# Patient Record
Sex: FEMALE | Race: WHITE | Employment: OTHER | URBAN - METROPOLITAN AREA
[De-identification: names, ages, dates, MRNs, and addresses within clinical notes are randomized per-mention and may not be internally consistent; named-entity substitution may affect disease eponyms.]

---

## 2018-05-10 ENCOUNTER — HOSPITAL ENCOUNTER (OUTPATIENT)
Dept: ULTRASOUND IMAGING | Age: 73
End: 2018-05-10
Payer: MEDICARE

## 2018-05-10 ENCOUNTER — HOSPITAL ENCOUNTER (OUTPATIENT)
Dept: MAMMOGRAPHY | Age: 73
Discharge: HOME | End: 2018-05-12
Payer: MEDICARE

## 2018-05-10 DIAGNOSIS — Z09 FOLLOW-UP EXAM, 3-6 MONTHS SINCE PREVIOUS EXAM: ICD-10-CM

## 2018-05-10 PROCEDURE — G0279 TOMOSYNTHESIS, MAMMO: HCPCS

## 2019-10-07 ENCOUNTER — HOSPITAL ENCOUNTER (OUTPATIENT)
Dept: MAMMOGRAPHY | Age: 74
Discharge: HOME | End: 2019-10-09
Payer: MEDICARE

## 2019-10-07 DIAGNOSIS — Z12.39 SCREENING FOR BREAST CANCER: ICD-10-CM

## 2019-10-07 PROCEDURE — 77067 SCR MAMMO BI INCL CAD: CPT

## 2019-10-14 ENCOUNTER — HOSPITAL ENCOUNTER (OUTPATIENT)
Dept: MAMMOGRAPHY | Age: 74
Discharge: HOME | End: 2019-10-16
Payer: MEDICARE

## 2019-10-14 ENCOUNTER — HOSPITAL ENCOUNTER (OUTPATIENT)
Dept: ULTRASOUND IMAGING | Age: 74
End: 2019-10-14
Payer: MEDICARE

## 2019-10-14 DIAGNOSIS — N64.89 BREAST ASYMMETRY: ICD-10-CM

## 2019-10-14 PROCEDURE — G0279 TOMOSYNTHESIS, MAMMO: HCPCS

## 2020-01-16 LAB
AVERAGE GLUCOSE: 117
CHOLESTEROL, TOTAL: 203 MG/DL
CHOLESTEROL/HDL RATIO: 2.4
HBA1C MFR BLD: 5.7 %
HDLC SERPL-MCNC: 84 MG/DL (ref 35–70)
LDL CHOLESTEROL CALCULATED: 92 MG/DL (ref 0–160)
NONHDLC SERPL-MCNC: ABNORMAL MG/DL
TRIGL SERPL-MCNC: 137 MG/DL
VITAMIN B-12: 190
VITAMIN D 25-HYDROXY: 43
VITAMIN D2, 25 HYDROXY: NORMAL
VITAMIN D3,25 HYDROXY: NORMAL
VLDLC SERPL CALC-MCNC: 27 MG/DL

## 2020-09-17 VITALS
RESPIRATION RATE: 14 BRPM | WEIGHT: 203 LBS | DIASTOLIC BLOOD PRESSURE: 70 MMHG | HEART RATE: 76 BPM | SYSTOLIC BLOOD PRESSURE: 120 MMHG | TEMPERATURE: 95.5 F

## 2020-09-17 RX ORDER — PROPRANOLOL HCL 60 MG
60 CAPSULE, EXTENDED RELEASE 24HR ORAL DAILY
COMMUNITY
End: 2021-02-08 | Stop reason: SDUPTHER

## 2020-09-17 RX ORDER — PRAVASTATIN SODIUM 10 MG
10 TABLET ORAL DAILY
COMMUNITY
End: 2021-02-08 | Stop reason: SDUPTHER

## 2020-09-17 RX ORDER — HYDROCODONE BITARTRATE AND ACETAMINOPHEN 5; 325 MG/1; MG/1
1 TABLET ORAL EVERY 6 HOURS PRN
COMMUNITY
End: 2021-02-08 | Stop reason: SDUPTHER

## 2020-09-17 RX ORDER — OMEPRAZOLE 40 MG/1
40 CAPSULE, DELAYED RELEASE ORAL DAILY
COMMUNITY
End: 2021-02-08 | Stop reason: SDUPTHER

## 2020-09-17 RX ORDER — CHOLECALCIFEROL (VITAMIN D3) 1250 MCG
1 CAPSULE ORAL WEEKLY
COMMUNITY
End: 2022-04-12 | Stop reason: SDUPTHER

## 2020-09-17 RX ORDER — TIZANIDINE 4 MG/1
4 TABLET ORAL 2 TIMES DAILY PRN
COMMUNITY
End: 2020-11-17 | Stop reason: SDUPTHER

## 2020-09-17 RX ORDER — AMLODIPINE BESYLATE 5 MG/1
5 TABLET ORAL DAILY
COMMUNITY
End: 2021-02-08 | Stop reason: SDUPTHER

## 2020-09-17 RX ORDER — BUPROPION HYDROCHLORIDE 150 MG/1
300 TABLET ORAL
COMMUNITY
Start: 2004-09-27 | End: 2021-02-08 | Stop reason: SDUPTHER

## 2020-09-17 SDOH — HEALTH STABILITY: MENTAL HEALTH: HOW OFTEN DO YOU HAVE A DRINK CONTAINING ALCOHOL?: NEVER

## 2020-11-17 RX ORDER — TIZANIDINE 4 MG/1
4 TABLET ORAL 2 TIMES DAILY PRN
Qty: 60 TABLET | Refills: 0 | Status: SHIPPED
Start: 2020-11-17 | End: 2020-12-18

## 2020-12-18 RX ORDER — TIZANIDINE 4 MG/1
TABLET ORAL
Qty: 180 TABLET | Refills: 0 | Status: SHIPPED
Start: 2020-12-18 | End: 2021-01-05 | Stop reason: SDUPTHER

## 2021-01-05 RX ORDER — TIZANIDINE 4 MG/1
4 TABLET ORAL 2 TIMES DAILY
Qty: 60 TABLET | Refills: 1 | Status: SHIPPED
Start: 2021-01-05 | End: 2021-02-08 | Stop reason: SDUPTHER

## 2021-01-05 NOTE — TELEPHONE ENCOUNTER
Patient is requesting a refill of:  Medication: tiZANidine (ZANAFLEX) 4 MG tablet  Dose: 4 mg  Frequency:  daily  Pharmacy: [id number] seen Visit date not found  Next appt Visit date not found

## 2021-01-29 LAB
ALBUMIN SERPL-MCNC: NORMAL G/DL
ALP BLD-CCNC: NORMAL U/L
ALT SERPL-CCNC: NORMAL U/L
ANION GAP SERPL CALCULATED.3IONS-SCNC: NORMAL MMOL/L
AST SERPL-CCNC: NORMAL U/L
AVERAGE GLUCOSE: NORMAL
BASOPHILS ABSOLUTE: NORMAL
BASOPHILS RELATIVE PERCENT: NORMAL
BILIRUB SERPL-MCNC: NORMAL MG/DL
BUN BLDV-MCNC: NORMAL MG/DL
CALCIUM SERPL-MCNC: NORMAL MG/DL
CHLORIDE BLD-SCNC: NORMAL MMOL/L
CHOLESTEROL, TOTAL: NORMAL
CHOLESTEROL/HDL RATIO: NORMAL
CO2: NORMAL
CREAT SERPL-MCNC: NORMAL MG/DL
EOSINOPHILS ABSOLUTE: NORMAL
EOSINOPHILS RELATIVE PERCENT: NORMAL
GFR CALCULATED: NORMAL
GLUCOSE BLD-MCNC: NORMAL MG/DL
HBA1C MFR BLD: 5.7 %
HCT VFR BLD CALC: NORMAL %
HDLC SERPL-MCNC: NORMAL MG/DL
HEMOGLOBIN: NORMAL
LDL CHOLESTEROL CALCULATED: NORMAL
LYMPHOCYTES ABSOLUTE: NORMAL
LYMPHOCYTES RELATIVE PERCENT: NORMAL
MCH RBC QN AUTO: NORMAL PG
MCHC RBC AUTO-ENTMCNC: NORMAL G/DL
MCV RBC AUTO: NORMAL FL
MONOCYTES ABSOLUTE: NORMAL
MONOCYTES RELATIVE PERCENT: NORMAL
NEUTROPHILS ABSOLUTE: NORMAL
NEUTROPHILS RELATIVE PERCENT: NORMAL
NONHDLC SERPL-MCNC: NORMAL MG/DL
PLATELET # BLD: NORMAL 10*3/UL
PMV BLD AUTO: NORMAL FL
POTASSIUM SERPL-SCNC: NORMAL MMOL/L
RBC # BLD: NORMAL 10*6/UL
SODIUM BLD-SCNC: NORMAL MMOL/L
TOTAL PROTEIN: NORMAL
TRIGL SERPL-MCNC: NORMAL MG/DL
URIC ACID: NORMAL
VLDLC SERPL CALC-MCNC: NORMAL MG/DL
WBC # BLD: NORMAL 10*3/UL

## 2021-02-08 ENCOUNTER — OFFICE VISIT (OUTPATIENT)
Dept: FAMILY MEDICINE CLINIC | Age: 76
End: 2021-02-08
Payer: MEDICARE

## 2021-02-08 VITALS
OXYGEN SATURATION: 97 % | BODY MASS INDEX: 31.86 KG/M2 | TEMPERATURE: 98 F | WEIGHT: 203 LBS | HEIGHT: 67 IN | HEART RATE: 92 BPM | DIASTOLIC BLOOD PRESSURE: 80 MMHG | SYSTOLIC BLOOD PRESSURE: 128 MMHG

## 2021-02-08 DIAGNOSIS — R92.8 ABNORMAL MAMMOGRAM: ICD-10-CM

## 2021-02-08 DIAGNOSIS — Z00.00 ANNUAL PHYSICAL EXAM: Primary | ICD-10-CM

## 2021-02-08 DIAGNOSIS — I10 ESSENTIAL HYPERTENSION: ICD-10-CM

## 2021-02-08 DIAGNOSIS — E78.5 HYPERLIPIDEMIA, UNSPECIFIED HYPERLIPIDEMIA TYPE: ICD-10-CM

## 2021-02-08 DIAGNOSIS — G89.29 CHRONIC BILATERAL LOW BACK PAIN, UNSPECIFIED WHETHER SCIATICA PRESENT: ICD-10-CM

## 2021-02-08 DIAGNOSIS — M76.60 ACHILLES TENDINITIS, UNSPECIFIED LATERALITY: ICD-10-CM

## 2021-02-08 DIAGNOSIS — M54.50 CHRONIC BILATERAL LOW BACK PAIN, UNSPECIFIED WHETHER SCIATICA PRESENT: ICD-10-CM

## 2021-02-08 DIAGNOSIS — D18.00 CAVERNOUS ANGIOMA: ICD-10-CM

## 2021-02-08 LAB
BILIRUBIN, POC: NORMAL
BLOOD URINE, POC: NORMAL
CLARITY, POC: NORMAL
COLOR, POC: NORMAL
GLUCOSE URINE, POC: NORMAL
KETONES, POC: NORMAL
LEUKOCYTE EST, POC: NORMAL
NITRITE, POC: NORMAL
PH, POC: 5.5
PROTEIN, POC: NORMAL
SPECIFIC GRAVITY, POC: 1.03
UROBILINOGEN, POC: 0.2

## 2021-02-08 PROCEDURE — 81002 URINALYSIS NONAUTO W/O SCOPE: CPT | Performed by: INTERNAL MEDICINE

## 2021-02-08 PROCEDURE — 93000 ELECTROCARDIOGRAM COMPLETE: CPT | Performed by: INTERNAL MEDICINE

## 2021-02-08 PROCEDURE — 99397 PER PM REEVAL EST PAT 65+ YR: CPT | Performed by: INTERNAL MEDICINE

## 2021-02-08 PROCEDURE — G8482 FLU IMMUNIZE ORDER/ADMIN: HCPCS | Performed by: INTERNAL MEDICINE

## 2021-02-08 RX ORDER — OMEPRAZOLE 40 MG/1
40 CAPSULE, DELAYED RELEASE ORAL DAILY
Qty: 90 CAPSULE | Refills: 1 | Status: SHIPPED
Start: 2021-02-08 | End: 2021-06-14 | Stop reason: SDUPTHER

## 2021-02-08 RX ORDER — AMLODIPINE BESYLATE 5 MG/1
5 TABLET ORAL DAILY
Qty: 90 TABLET | Refills: 1 | Status: SHIPPED
Start: 2021-02-08 | End: 2021-06-14 | Stop reason: SDUPTHER

## 2021-02-08 RX ORDER — TIZANIDINE 4 MG/1
4 TABLET ORAL 2 TIMES DAILY
Qty: 60 TABLET | Refills: 1 | Status: SHIPPED
Start: 2021-02-08 | End: 2021-03-10

## 2021-02-08 RX ORDER — PRAVASTATIN SODIUM 10 MG
10 TABLET ORAL DAILY
Qty: 90 TABLET | Refills: 1 | Status: SHIPPED
Start: 2021-02-08 | End: 2021-06-14 | Stop reason: SDUPTHER

## 2021-02-08 RX ORDER — METHYLPREDNISOLONE 4 MG/1
TABLET ORAL
Qty: 1 KIT | Refills: 0 | Status: SHIPPED | OUTPATIENT
Start: 2021-02-08 | End: 2021-02-14

## 2021-02-08 RX ORDER — PROPRANOLOL HCL 60 MG
60 CAPSULE, EXTENDED RELEASE 24HR ORAL DAILY
Qty: 90 CAPSULE | Refills: 1 | Status: SHIPPED
Start: 2021-02-08 | End: 2021-06-14 | Stop reason: SDUPTHER

## 2021-02-08 RX ORDER — BUPROPION HYDROCHLORIDE 300 MG/1
300 TABLET ORAL EVERY MORNING
Qty: 90 TABLET | Refills: 1 | Status: SHIPPED
Start: 2021-02-08 | End: 2021-06-14 | Stop reason: SDUPTHER

## 2021-02-08 RX ORDER — HYDROCODONE BITARTRATE AND ACETAMINOPHEN 5; 325 MG/1; MG/1
1 TABLET ORAL 2 TIMES DAILY PRN
Qty: 60 TABLET | Refills: 0 | Status: SHIPPED | OUTPATIENT
Start: 2021-02-08 | End: 2021-03-10

## 2021-02-08 SDOH — ECONOMIC STABILITY: FOOD INSECURITY: WITHIN THE PAST 12 MONTHS, YOU WORRIED THAT YOUR FOOD WOULD RUN OUT BEFORE YOU GOT MONEY TO BUY MORE.: NEVER TRUE

## 2021-02-08 SDOH — ECONOMIC STABILITY: INCOME INSECURITY: HOW HARD IS IT FOR YOU TO PAY FOR THE VERY BASICS LIKE FOOD, HOUSING, MEDICAL CARE, AND HEATING?: NOT ASKED

## 2021-02-08 SDOH — ECONOMIC STABILITY: TRANSPORTATION INSECURITY
IN THE PAST 12 MONTHS, HAS LACK OF TRANSPORTATION KEPT YOU FROM MEETINGS, WORK, OR FROM GETTING THINGS NEEDED FOR DAILY LIVING?: NO

## 2021-02-08 SDOH — ECONOMIC STABILITY: FOOD INSECURITY: WITHIN THE PAST 12 MONTHS, THE FOOD YOU BOUGHT JUST DIDN'T LAST AND YOU DIDN'T HAVE MONEY TO GET MORE.: NEVER TRUE

## 2021-02-08 ASSESSMENT — PATIENT HEALTH QUESTIONNAIRE - PHQ9
SUM OF ALL RESPONSES TO PHQ9 QUESTIONS 1 & 2: 0
SUM OF ALL RESPONSES TO PHQ QUESTIONS 1-9: 0
2. FEELING DOWN, DEPRESSED OR HOPELESS: 0

## 2021-02-08 NOTE — PROGRESS NOTES
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     Per patient at age 6-9   [place] Cerebrovascular disease     Chronic back pain     GERD (gastroesophageal reflux disease)     Headache     Hyperlipidemia         Social History     Socioeconomic History    Marital status: Single     Spouse name: Not on file    Number of children: Not on file    Years of education: Not on file    Highest education level: Not on file   Occupational History    Not on file   Social Needs    Financial resource strain: Not on file    Food insecurity     Worry: Never true     Inability: Never true    Transportation needs     Medical: No     Non-medical: No   Tobacco Use    Smoking status: Never Smoker    Smokeless tobacco: Never Used   Substance and Sexual Activity    Alcohol use: Never     Frequency: Never    Drug use: Never    Sexual activity: Not on file   Lifestyle    Physical activity     Days per week: Not on file     Minutes per session: Not on file    Stress: Not on file   Relationships    Social connections     Talks on phone: Not on file     Gets together: Not on file Attends [employer] service: Not on file     Active member of club or organization: Not on file     Attends meetings of clubs or organizations: Not on file     Relationship status: Not on file    Intimate partner violence     Fear of current or ex partner: Not on file     Emotionally abused: Not on file     Physically abused: Not on file     Forced sexual activity: Not on file   Other Topics Concern    Not on file   Social History Narrative    Not on file        Past Surgical History:   Procedure Laterality Date    BRAIN SURGERY  07/2010    Cerebellar Cavernous Angioma resection     BREAST CYST ASPIRATION      BREAST REDUCTION SURGERY Bilateral 1993    HAND SURGERY Left     trapezius removal     HYSTERECTOMY, TOTAL ABDOMINAL  1979    SALIVARY GLAND SURGERY Left 1992    TONSILLECTOMY          Family History   Problem Relation Age of Onset    Heart Attack Father 61        Allergies   Allergen Reactions    Aspirin     Codeine Hives    Linzess [Linaclotide]      crasmps        ROS  No acute distress  Cardiac: Denies any chest pain or palpitation  No exertional angina no MI or CHF  Respiratory: Denies any cough or shortness of breath  Denies any chronic cough    GI: No abdominal pain. Denies any nausea vomiting or diarrhea  Denies any dysphagia  : Denies any dysuria frequency or hematuria  Neuro: No headache   Has chronic ataxia with a cane to walk no recent fall or injury  Endocrine: No diabetes  Skin: normal  No recent weight gain or weight loss  Denies any change in vision    Objective:    /80   Pulse 92   Temp 98 °F (36.7 °C)   Ht 5' 7\" (1.702 m)   Wt 203 lb (92.1 kg)   SpO2 97%   BMI 31.79 kg/m²     Constitutional: Alert awake and oriented  Eyes: Pupils equal bilaterally. Extraocular muscles intact  Neck: no JVD adenopathy no bruit  Heart:  RRR, no murmurs, gallops, or rubs.   Lungs:    no wheeze, rales or rhonchi  Abd: bowel sounds present, nontender, nondistended, no masses Extrem:  No clubbing, cyanosis, or edema    Right ankle there is a tenderness posterior aspect of the calcaneum under the Achilles tendon  Neuro: AAOx3, strength 3-4 out of 5 on the left, has tremors of outstretched hands  Psychological: no depression or anxiety       Current Outpatient Medications   Medication Sig Dispense Refill    propranolol (INDERAL LA) 60 MG extended release capsule Take 1 capsule by mouth daily 90 capsule 1    pravastatin (PRAVACHOL) 10 MG tablet Take 1 tablet by mouth daily 90 tablet 1    omeprazole (PRILOSEC) 40 MG delayed release capsule Take 1 capsule by mouth daily 90 capsule 1    amLODIPine (NORVASC) 5 MG tablet Take 1 tablet by mouth daily 90 tablet 1    buPROPion (WELLBUTRIN XL) 300 MG extended release tablet Take 1 tablet by mouth every morning 90 tablet 1    tiZANidine (ZANAFLEX) 4 MG tablet Take 1 tablet by mouth 2 times daily 60 tablet 1    HYDROcodone-acetaminophen (NORCO) 5-325 MG per tablet Take 1 tablet by mouth 2 times daily as needed for Pain for up to 30 days. 60 tablet 0    methylPREDNISolone (MEDROL DOSEPACK) 4 MG tablet Take by mouth. 1 kit 0    Cholecalciferol (VITAMIN D3) 1.25 MG (73459 UT) CAPS Take 1 capsule by mouth once a week       No current facility-administered medications for this visit.          Last 3 BMP  Lab Results   Component Value Date/Time     08/16/2017 09:30 AM     04/08/2016 08:34 AM     03/14/2016 02:36 PM    K 4.0 08/16/2017 09:30 AM    K 4.7 04/08/2016 08:34 AM    K 4.0 03/14/2016 02:36 PM    CL 99 08/16/2017 09:30 AM     04/08/2016 08:34 AM     03/14/2016 02:36 PM    CO2 23 08/16/2017 09:30 AM    CO2 25 04/08/2016 08:34 AM    CO2 25 03/14/2016 02:36 PM    BUN 14 08/16/2017 09:30 AM    BUN 17 04/08/2016 08:34 AM    BUN 13 03/14/2016 02:36 PM    CREATININE 0.9 08/16/2017 09:30 AM    CREATININE 0.8 04/08/2016 08:34 AM    CREATININE 0.8 03/14/2016 02:36 PM    GLUCOSE 98 08/16/2017 09:30 AM GLUCOSE 103 04/08/2016 08:34 AM    GLUCOSE 93 03/14/2016 02:36 PM    GLUCOSE 93 05/09/2012 08:56 AM    GLUCOSE 103 08/05/2011 09:50 AM    CALCIUM 9.6 08/16/2017 09:30 AM    CALCIUM 9.9 04/08/2016 08:34 AM    CALCIUM 9.8 03/14/2016 02:36 PM       Last 3 CMP:    Lab Results   Component Value Date/Time     08/16/2017 09:30 AM     04/08/2016 08:34 AM     03/14/2016 02:36 PM    K 4.0 08/16/2017 09:30 AM    K 4.7 04/08/2016 08:34 AM    K 4.0 03/14/2016 02:36 PM    CL 99 08/16/2017 09:30 AM     04/08/2016 08:34 AM     03/14/2016 02:36 PM    CO2 23 08/16/2017 09:30 AM    CO2 25 04/08/2016 08:34 AM    CO2 25 03/14/2016 02:36 PM    BUN 14 08/16/2017 09:30 AM    BUN 17 04/08/2016 08:34 AM    BUN 13 03/14/2016 02:36 PM    CREATININE 0.9 08/16/2017 09:30 AM    CREATININE 0.8 04/08/2016 08:34 AM    CREATININE 0.8 03/14/2016 02:36 PM    GLUCOSE 98 08/16/2017 09:30 AM    GLUCOSE 103 04/08/2016 08:34 AM    GLUCOSE 93 03/14/2016 02:36 PM    GLUCOSE 93 05/09/2012 08:56 AM    GLUCOSE 103 08/05/2011 09:50 AM    CALCIUM 9.6 08/16/2017 09:30 AM    CALCIUM 9.9 04/08/2016 08:34 AM    CALCIUM 9.8 03/14/2016 02:36 PM    PROT 6.8 08/16/2017 09:30 AM    PROT 7.5 04/08/2016 08:34 AM    PROT 7.5 03/14/2016 02:36 PM    LABALBU 4.1 08/16/2017 09:30 AM    LABALBU 4.3 04/08/2016 08:34 AM    LABALBU 4.4 03/14/2016 02:36 PM    LABALBU 4.2 05/09/2012 08:56 AM    LABALBU 4.2 08/05/2011 09:50 AM    BILITOT 0.4 08/16/2017 09:30 AM    BILITOT 0.4 04/08/2016 08:34 AM    BILITOT 0.3 03/14/2016 02:36 PM    ALKPHOS 98 08/16/2017 09:30 AM    ALKPHOS 87 04/08/2016 08:34 AM    ALKPHOS 90 03/14/2016 02:36 PM    AST 17 08/16/2017 09:30 AM    AST 21 04/08/2016 08:34 AM    AST 14 03/14/2016 02:36 PM    ALT 16 08/16/2017 09:30 AM    ALT 25 04/08/2016 08:34 AM    ALT 16 03/14/2016 02:36 PM        CBC:   Lab Results   Component Value Date/Time    WBC 7.0 08/16/2017 09:30 AM    RBC 4.88 08/16/2017 09:30 AM    HGB 14.4 08/16/2017 09:30 AM HCT 45.2 08/16/2017 09:30 AM    MCV 92.6 08/16/2017 09:30 AM    MCH 29.5 08/16/2017 09:30 AM    MCHC 31.9 (L) 08/16/2017 09:30 AM    RDW 14.3 08/16/2017 09:30 AM     08/16/2017 09:30 AM    MPV 10.4 08/16/2017 09:30 AM       A1C:  Lab Results   Component Value Date/Time    LABA1C 5.7 01/28/2021       Lipid panel:  Lab Results   Component Value Date    CHOL 203 01/16/2020    CHOL 237 08/16/2017    CHOL 272 04/08/2016    TRIG 137 01/16/2020    TRIG 90 08/16/2017    TRIG 113 04/08/2016    HDL 84 01/16/2020    HDL 87 08/16/2017    HDL 87 04/08/2016        Lab Results   Component Value Date/Time    PROT 6.8 08/16/2017 09:30 AM    PROT 7.5 04/08/2016 08:34 AM    PROT 7.5 03/14/2016 02:36 PM       No results found for: MG      Assessment. Kenji Door was seen today for annual exam.    Diagnoses and all orders for this visit:    Annual physical exam  -     EKG 12 Lead; Future  -     POCT Urinalysis no Micro  -     EKG 12 Lead    Abnormal mammogram  -     [facility] DIGITAL DIAGNOSTIC BILATERAL PER PROTOCOL; Future  -     [facility] DIGITAL DIAGNOSTIC W OR WO CAD LEFT; Future    Chronic bilateral low back pain, unspecified whether sciatica present  -     HYDROcodone-acetaminophen (NORCO) 5-325 MG per tablet; Take 1 tablet by mouth 2 times daily as needed for Pain for up to 30 days. Essential hypertension    Cavernous angioma    Hyperlipidemia, unspecified hyperlipidemia type    Achilles tendinitis, unspecified laterality    Other orders  -     propranolol (INDERAL LA) 60 MG extended release capsule; Take 1 capsule by mouth daily  -     pravastatin (PRAVACHOL) 10 MG tablet; Take 1 tablet by mouth daily  -     omeprazole (PRILOSEC) 40 MG delayed release capsule; Take 1 capsule by mouth daily  -     amLODIPine (NORVASC) 5 MG tablet; Take 1 tablet by mouth daily  -     buPROPion (WELLBUTRIN XL) 300 MG extended release tablet;  Take 1 tablet by mouth every morning -     tiZANidine (ZANAFLEX) 4 MG tablet; Take 1 tablet by mouth 2 times daily  -     methylPREDNISolone (MEDROL DOSEPACK) 4 MG tablet; Take by mouth. There is no problem list on file for this patient. Plan: Continue current blood pressure medication    Medrol Dosepak for tendinitis did help her last time if recurrence of symptoms see a podiatrist she will make an appointment    Depression controlled with the Wellbutrin    Labs reviewed continue current cluster medication      Chronic back pain with intermittent flareup she takes Norco once or twice a month prescription given    Zanaflex at night as needed      She has chronic ataxia seen by Dr. Rhiannon Carpenter sometime ago she also had a spinal tap no further recommendations as per neurologist as per patient  She declines to see any neurologist locally or [place]      Declined a colonoscopy  Declined stool cards    Agreed to repeat the mammogram      Screening on the right diagnostic in the left      Lifestyle diet modification discussed      Fall precautions discussed      Vaccination discussed    EKG is normal sinus rhythm no acute changes    UA was negative    She has god child Virginia Jorge core has her personal contact she has no other family members    Return in about 4 months (around 6/8/2021).        Hazel Lundy MD  2:19 PM  2/8/2021     DE

## 2021-02-11 ENCOUNTER — TELEPHONE (OUTPATIENT)
Dept: FAMILY MEDICINE CLINIC | Age: 76
End: 2021-02-11

## 2021-02-11 NOTE — TELEPHONE ENCOUNTER
Patient called need Rx for handicap placard please mail to pt     Last seen 2/8/2021  Next appt 6/14/2021

## 2021-03-10 RX ORDER — TIZANIDINE 4 MG/1
TABLET ORAL
Qty: 60 TABLET | Refills: 1 | Status: SHIPPED
Start: 2021-03-10 | End: 2021-05-12

## 2021-04-06 RX ORDER — IPRATROPIUM BROMIDE 42 UG/1
2 SPRAY, METERED NASAL 2 TIMES DAILY
Qty: 1 BOTTLE | Refills: 1 | Status: SHIPPED
Start: 2021-04-06 | End: 2021-09-21 | Stop reason: SDUPTHER

## 2021-04-06 NOTE — TELEPHONE ENCOUNTER
Pt called asking for refill of Ipratropium bromide nasal spray. Can't find in chart or old records.     Last seen 2/8/2021  Next appt 6/14/2021  Joby Pham)

## 2021-04-29 DIAGNOSIS — Z12.31 SCREENING MAMMOGRAM, ENCOUNTER FOR: Primary | ICD-10-CM

## 2021-04-30 ENCOUNTER — HOSPITAL ENCOUNTER (OUTPATIENT)
Dept: ULTRASOUND IMAGING | Age: 76
End: 2021-04-30
Payer: MEDICARE

## 2021-04-30 ENCOUNTER — HOSPITAL ENCOUNTER (OUTPATIENT)
Dept: MAMMOGRAPHY | Age: 76
Discharge: HOME | End: 2021-05-02
Payer: MEDICARE

## 2021-04-30 VITALS — WEIGHT: 180 LBS | BODY MASS INDEX: 29.99 KG/M2 | HEIGHT: 65 IN

## 2021-04-30 DIAGNOSIS — Z12.31 SCREENING MAMMOGRAM, ENCOUNTER FOR: ICD-10-CM

## 2021-04-30 DIAGNOSIS — R92.8 ABNORMAL MAMMOGRAM: ICD-10-CM

## 2021-04-30 PROCEDURE — 77067 SCR MAMMO BI INCL CAD: CPT

## 2021-05-12 RX ORDER — TIZANIDINE 4 MG/1
TABLET ORAL
Qty: 60 TABLET | Refills: 1 | Status: SHIPPED
Start: 2021-05-12 | End: 2021-07-14

## 2021-06-14 ENCOUNTER — OFFICE VISIT (OUTPATIENT)
Dept: FAMILY MEDICINE CLINIC | Age: 76
End: 2021-06-14
Payer: MEDICARE

## 2021-06-14 VITALS
OXYGEN SATURATION: 98 % | WEIGHT: 205 LBS | SYSTOLIC BLOOD PRESSURE: 132 MMHG | DIASTOLIC BLOOD PRESSURE: 80 MMHG | BODY MASS INDEX: 34.11 KG/M2 | HEART RATE: 75 BPM

## 2021-06-14 DIAGNOSIS — R27.0 ATAXIA: ICD-10-CM

## 2021-06-14 DIAGNOSIS — E78.5 HYPERLIPIDEMIA, UNSPECIFIED HYPERLIPIDEMIA TYPE: ICD-10-CM

## 2021-06-14 DIAGNOSIS — I10 ESSENTIAL HYPERTENSION: ICD-10-CM

## 2021-06-14 DIAGNOSIS — M25.571 ACUTE RIGHT ANKLE PAIN: ICD-10-CM

## 2021-06-14 DIAGNOSIS — F32.A DEPRESSION, UNSPECIFIED DEPRESSION TYPE: ICD-10-CM

## 2021-06-14 DIAGNOSIS — M25.512 ACUTE PAIN OF LEFT SHOULDER: Primary | ICD-10-CM

## 2021-06-14 DIAGNOSIS — G25.0 BENIGN ESSENTIAL TREMOR: ICD-10-CM

## 2021-06-14 DIAGNOSIS — R73.9 HYPERGLYCEMIA: ICD-10-CM

## 2021-06-14 PROCEDURE — 1123F ACP DISCUSS/DSCN MKR DOCD: CPT | Performed by: INTERNAL MEDICINE

## 2021-06-14 PROCEDURE — G8427 DOCREV CUR MEDS BY ELIG CLIN: HCPCS | Performed by: INTERNAL MEDICINE

## 2021-06-14 PROCEDURE — 1090F PRES/ABSN URINE INCON ASSESS: CPT | Performed by: INTERNAL MEDICINE

## 2021-06-14 PROCEDURE — 4040F PNEUMOC VAC/ADMIN/RCVD: CPT | Performed by: INTERNAL MEDICINE

## 2021-06-14 PROCEDURE — 4004F PT TOBACCO SCREEN RCVD TLK: CPT | Performed by: INTERNAL MEDICINE

## 2021-06-14 PROCEDURE — 99213 OFFICE O/P EST LOW 20 MIN: CPT | Performed by: INTERNAL MEDICINE

## 2021-06-14 PROCEDURE — 3017F COLORECTAL CA SCREEN DOC REV: CPT | Performed by: INTERNAL MEDICINE

## 2021-06-14 PROCEDURE — G8400 PT W/DXA NO RESULTS DOC: HCPCS | Performed by: INTERNAL MEDICINE

## 2021-06-14 PROCEDURE — G8417 CALC BMI ABV UP PARAM F/U: HCPCS | Performed by: INTERNAL MEDICINE

## 2021-06-14 RX ORDER — OMEPRAZOLE 40 MG/1
40 CAPSULE, DELAYED RELEASE ORAL DAILY
Qty: 90 CAPSULE | Refills: 1 | Status: SHIPPED
Start: 2021-06-14 | End: 2021-08-16

## 2021-06-14 RX ORDER — METHYLPREDNISOLONE 4 MG/1
TABLET ORAL
COMMUNITY
Start: 2021-03-11 | End: 2021-06-14 | Stop reason: SDUPTHER

## 2021-06-14 RX ORDER — BUPROPION HYDROCHLORIDE 300 MG/1
300 TABLET ORAL EVERY MORNING
Qty: 90 TABLET | Refills: 1 | Status: SHIPPED
Start: 2021-06-14 | End: 2021-08-16

## 2021-06-14 RX ORDER — HYDROCODONE BITARTRATE AND ACETAMINOPHEN 5; 325 MG/1; MG/1
1 TABLET ORAL EVERY 8 HOURS PRN
Qty: 20 TABLET | Refills: 0 | Status: SHIPPED | OUTPATIENT
Start: 2021-06-14 | End: 2021-06-28

## 2021-06-14 RX ORDER — PRAVASTATIN SODIUM 10 MG
10 TABLET ORAL DAILY
Qty: 90 TABLET | Refills: 1 | Status: SHIPPED
Start: 2021-06-14 | End: 2021-08-16

## 2021-06-14 RX ORDER — PROPRANOLOL HCL 60 MG
60 CAPSULE, EXTENDED RELEASE 24HR ORAL DAILY
Qty: 90 CAPSULE | Refills: 1 | Status: SHIPPED
Start: 2021-06-14 | End: 2021-08-16

## 2021-06-14 RX ORDER — METHYLPREDNISOLONE 4 MG/1
TABLET ORAL
Qty: 1 KIT | Refills: 0 | Status: SHIPPED
Start: 2021-06-14 | End: 2021-09-21

## 2021-06-14 RX ORDER — AMLODIPINE BESYLATE 5 MG/1
5 TABLET ORAL DAILY
Qty: 90 TABLET | Refills: 1 | Status: SHIPPED
Start: 2021-06-14 | End: 2021-08-16

## 2021-06-14 NOTE — PROGRESS NOTES
Subjective:     Chief Complaint   Patient presents with    3 Month Follow-Up   Follow-up on the hypertension hyperlipidemia    Has pain in the left shoulder and the right ankle,    She was having pain in the right ankle seen by Dr. Harman Sharma who recommended special boots which caused her balance to get worse so she stopped it using a cane to walk    Takes the pain medication occasionally      Has chronic back pain which flares up occasionally    Has chronic ataxia and tremors has been seen by neurologist responding to propranolol    Past Medical History:   Diagnosis Date    Angioma     brain    Anxiety     Ataxia     Cerebrovascular accident (CVA) ([facility] [address]     Per patient at age 6-9   [facility] Cerebrovascular disease     Chronic back pain     GERD (gastroesophageal reflux disease)     Headache     Hyperlipidemia         Social History     Socioeconomic History    Marital status: Single     Spouse name: Not on file    Number of children: Not on file    Years of education: Not on file    Highest education level: Not on file   Occupational History    Not on file   Tobacco Use    Smoking status: Never Smoker    Smokeless tobacco: Never Used   Substance and Sexual Activity    Alcohol use: Never    Drug use: Never    Sexual activity: Not on file   Other Topics Concern    Not on file   Social History Narrative    Not on file     Social Determinants of Health     Financial Resource Strain:     Difficulty of Paying Living Expenses:    Food Insecurity: No Food Insecurity    Worried About Running Out of Food in the Last Year: Never true    Pallavi of Food in the Last Year: Never true   Transportation Needs: No Transportation Needs    Lack of Transportation (Medical): No    Lack of Transportation (Non-Medical):  No   Physical Activity:     Days of Exercise per Week:     Minutes of Exercise per Session:    Stress:     Feeling of Stress :    Social Connections:     Frequency of Communication with Friends and intact  Neck: no JVD adenopathy no bruit  Heart:  RRR, no murmurs, gallops, or rubs. Lungs:    no wheeze, rales or rhonchi  Abd: bowel sounds present, nontender, nondistended, no masses  Extrem:  No clubbing, cyanosis, or edema  Neuro: AAOx3,No Focal deficit  Walks with a cane  Fall precaution discussed  Psychological: no depression or anxiety       Current Outpatient Medications   Medication Sig Dispense Refill    methylPREDNISolone (MEDROL DOSEPACK) 4 MG tablet FOLLOW PACKAGE DIRECTIONS 1 kit 0    amLODIPine (NORVASC) 5 MG tablet Take 1 tablet by mouth daily 90 tablet 1    buPROPion (WELLBUTRIN XL) 300 MG extended release tablet Take 1 tablet by mouth every morning 90 tablet 1    pravastatin (PRAVACHOL) 10 MG tablet Take 1 tablet by mouth daily 90 tablet 1    omeprazole (PRILOSEC) 40 MG delayed release capsule Take 1 capsule by mouth daily 90 capsule 1    propranolol (INDERAL LA) 60 MG extended release capsule Take 1 capsule by mouth daily 90 capsule 1    HYDROcodone-acetaminophen (NORCO) 5-325 MG per tablet Take 1 tablet by mouth every 8 hours as needed for Pain for up to 14 days. Intended supply: 5 days. Take lowest dose possible to manage pain 20 tablet 0    tiZANidine (ZANAFLEX) 4 MG tablet TAKE 1 TABLET BY MOUTH TWICE DAILY 60 tablet 1    ipratropium (ATROVENT) 0.06 % nasal spray 2 sprays by Each Nostril route 2 times daily 1 Bottle 1    Handicap Placard MISC UNTIL 2/11/2026 1 each 0    Cholecalciferol (VITAMIN D3) 1.25 MG (72742 UT) CAPS Take 1 capsule by mouth once a week       No current facility-administered medications for this visit.         Last 3 BMP  Lab Results   Component Value Date/Time     08/16/2017 09:30 AM     04/08/2016 08:34 AM     03/14/2016 02:36 PM    K 4.0 08/16/2017 09:30 AM    K 4.7 04/08/2016 08:34 AM    K 4.0 03/14/2016 02:36 PM    CL 99 08/16/2017 09:30 AM     04/08/2016 08:34 AM     03/14/2016 02:36 PM    CO2 23 08/16/2017 09:30 AM    CO2 25 04/08/2016 08:34 AM    CO2 25 03/14/2016 02:36 PM    BUN 14 08/16/2017 09:30 AM    BUN 17 04/08/2016 08:34 AM    BUN 13 03/14/2016 02:36 PM    CREATININE 0.9 08/16/2017 09:30 AM    CREATININE 0.8 04/08/2016 08:34 AM    CREATININE 0.8 03/14/2016 02:36 PM    GLUCOSE 98 08/16/2017 09:30 AM    GLUCOSE 103 04/08/2016 08:34 AM    GLUCOSE 93 03/14/2016 02:36 PM    GLUCOSE 93 05/09/2012 08:56 AM    GLUCOSE 103 08/05/2011 09:50 AM    CALCIUM 9.6 08/16/2017 09:30 AM    CALCIUM 9.9 04/08/2016 08:34 AM    CALCIUM 9.8 03/14/2016 02:36 PM       Last 3 CMP:    Lab Results   Component Value Date/Time     08/16/2017 09:30 AM     04/08/2016 08:34 AM     03/14/2016 02:36 PM    K 4.0 08/16/2017 09:30 AM    K 4.7 04/08/2016 08:34 AM    K 4.0 03/14/2016 02:36 PM    CL 99 08/16/2017 09:30 AM     04/08/2016 08:34 AM     03/14/2016 02:36 PM    CO2 23 08/16/2017 09:30 AM    CO2 25 04/08/2016 08:34 AM    CO2 25 03/14/2016 02:36 PM    BUN 14 08/16/2017 09:30 AM    BUN 17 04/08/2016 08:34 AM    BUN 13 03/14/2016 02:36 PM    CREATININE 0.9 08/16/2017 09:30 AM    CREATININE 0.8 04/08/2016 08:34 AM    CREATININE 0.8 03/14/2016 02:36 PM    GLUCOSE 98 08/16/2017 09:30 AM    GLUCOSE 103 04/08/2016 08:34 AM    GLUCOSE 93 03/14/2016 02:36 PM    GLUCOSE 93 05/09/2012 08:56 AM    GLUCOSE 103 08/05/2011 09:50 AM    CALCIUM 9.6 08/16/2017 09:30 AM    CALCIUM 9.9 04/08/2016 08:34 AM    CALCIUM 9.8 03/14/2016 02:36 PM    PROT 6.8 08/16/2017 09:30 AM    PROT 7.5 04/08/2016 08:34 AM    PROT 7.5 03/14/2016 02:36 PM    LABALBU 4.1 08/16/2017 09:30 AM    LABALBU 4.3 04/08/2016 08:34 AM    LABALBU 4.4 03/14/2016 02:36 PM    LABALBU 4.2 05/09/2012 08:56 AM    LABALBU 4.2 08/05/2011 09:50 AM    BILITOT 0.4 08/16/2017 09:30 AM    BILITOT 0.4 04/08/2016 08:34 AM    BILITOT 0.3 03/14/2016 02:36 PM    ALKPHOS 98 08/16/2017 09:30 AM    ALKPHOS 87 04/08/2016 08:34 AM    ALKPHOS 90 03/14/2016 02:36 PM    AST 17 08/16/2017 09:30 AM    AST 21 04/08/2016 08:34 AM    AST 14 03/14/2016 02:36 PM    ALT 16 08/16/2017 09:30 AM    ALT 25 04/08/2016 08:34 AM    ALT 16 03/14/2016 02:36 PM        CBC:   Lab Results   Component Value Date/Time    WBC 7.0 08/16/2017 09:30 AM    RBC 4.88 08/16/2017 09:30 AM    HGB 14.4 08/16/2017 09:30 AM    HCT 45.2 08/16/2017 09:30 AM    MCV 92.6 08/16/2017 09:30 AM    MCH 29.5 08/16/2017 09:30 AM    MCHC 31.9 (L) 08/16/2017 09:30 AM    RDW 14.3 08/16/2017 09:30 AM     08/16/2017 09:30 AM    MPV 10.4 08/16/2017 09:30 AM       A1C:  Lab Results   Component Value Date/Time    LABA1C 5.7 01/28/2021 12:00 AM       Lipid panel:  Lab Results   Component Value Date    CHOL 203 01/16/2020    CHOL 237 08/16/2017    CHOL 272 04/08/2016    TRIG 137 01/16/2020    TRIG 90 08/16/2017    TRIG 113 04/08/2016    HDL 84 01/16/2020    HDL 87 08/16/2017    HDL 87 04/08/2016        Lab Results   Component Value Date/Time    PROT 6.8 08/16/2017 09:30 AM    PROT 7.5 04/08/2016 08:34 AM    PROT 7.5 03/14/2016 02:36 PM       No results found for: MG      Assessment. Donnie Romero was seen today for 3 month follow-up. Diagnoses and all orders for this visit:    Acute pain of left shoulder  -     HYDROcodone-acetaminophen (NORCO) 5-325 MG per tablet; Take 1 tablet by mouth every 8 hours as needed for Pain for up to 14 days. Intended supply: 5 days. Take lowest dose possible to manage pain    Acute right ankle pain  -     HYDROcodone-acetaminophen (NORCO) 5-325 MG per tablet; Take 1 tablet by mouth every 8 hours as needed for Pain for up to 14 days. Intended supply: 5 days. Take lowest dose possible to manage pain    Hyperlipidemia, unspecified hyperlipidemia type  -     COMPREHENSIVE METABOLIC PANEL; Future  -     LIPID PANEL;  Future    Hyperglycemia  -     HEMOGLOBIN A1C; Future    Depression, unspecified depression type    Benign essential tremor    Ataxia    Essential hypertension    Other orders  -     methylPREDNISolone (MEDROL DOSEPACK) 4 MG tablet; FOLLOW PACKAGE DIRECTIONS  -     amLODIPine (NORVASC) 5 MG tablet; Take 1 tablet by mouth daily  -     buPROPion (WELLBUTRIN XL) 300 MG extended release tablet; Take 1 tablet by mouth every morning  -     pravastatin (PRAVACHOL) 10 MG tablet; Take 1 tablet by mouth daily  -     omeprazole (PRILOSEC) 40 MG delayed release capsule; Take 1 capsule by mouth daily  -     propranolol (INDERAL LA) 60 MG extended release capsule; Take 1 capsule by mouth daily       Patient Active Problem List   Diagnosis    Ataxia    Benign essential tremor    Depression    Hyperlipidemia       Plan: Acute pain in the left shoulder and the right ankle ibuprofen not helping Norco as needed for pain control 20 tablets    Hypertension controlled    Chronic ataxia fall precaution discussed using cane to walk avoid uneven surfaces  Declined to see any neurologist has been seen them before      Hyperlipidemia check CMP and lipid profile      Tremors are doing better with the propranolol      Depression controlled with the Wellbutrin      Her person to contact us Emani Richey  [phone]    Return in about 3 months (around 9/14/2021).        Molly Malloy MD  12:42 PM  6/14/2021     DE

## 2021-07-12 RX ORDER — ERGOCALCIFEROL 1.25 MG/1
CAPSULE ORAL
Qty: 6 CAPSULE | Refills: 1 | Status: SHIPPED
Start: 2021-07-12 | End: 2021-09-21 | Stop reason: SDUPTHER

## 2021-07-14 RX ORDER — TIZANIDINE 4 MG/1
TABLET ORAL
Qty: 60 TABLET | Refills: 1 | Status: SHIPPED
Start: 2021-07-14 | End: 2021-09-13

## 2021-08-16 RX ORDER — BUPROPION HYDROCHLORIDE 300 MG/1
300 TABLET ORAL EVERY MORNING
Qty: 90 TABLET | Refills: 1 | Status: SHIPPED
Start: 2021-08-16 | End: 2021-09-21 | Stop reason: SDUPTHER

## 2021-08-16 RX ORDER — OMEPRAZOLE 40 MG/1
40 CAPSULE, DELAYED RELEASE ORAL DAILY
Qty: 90 CAPSULE | Refills: 1 | Status: SHIPPED
Start: 2021-08-16 | End: 2021-09-21 | Stop reason: SDUPTHER

## 2021-08-16 RX ORDER — PROPRANOLOL HCL 60 MG
60 CAPSULE, EXTENDED RELEASE 24HR ORAL DAILY
Qty: 90 CAPSULE | Refills: 1 | Status: SHIPPED
Start: 2021-08-16 | End: 2021-09-21 | Stop reason: SDUPTHER

## 2021-08-16 RX ORDER — AMLODIPINE BESYLATE 5 MG/1
5 TABLET ORAL DAILY
Qty: 90 TABLET | Refills: 1 | Status: SHIPPED
Start: 2021-08-16 | End: 2021-09-21 | Stop reason: SDUPTHER

## 2021-08-16 RX ORDER — PRAVASTATIN SODIUM 10 MG
10 TABLET ORAL DAILY
Qty: 90 TABLET | Refills: 1 | Status: SHIPPED
Start: 2021-08-16 | End: 2021-09-21 | Stop reason: SDUPTHER

## 2021-09-02 ENCOUNTER — TELEPHONE (OUTPATIENT)
Dept: FAMILY MEDICINE CLINIC | Age: 76
End: 2021-09-02

## 2021-09-02 RX ORDER — CETIRIZINE HYDROCHLORIDE 10 MG/1
10 TABLET ORAL DAILY
Qty: 30 TABLET | Refills: 1 | Status: SHIPPED
Start: 2021-09-02 | End: 2021-09-21 | Stop reason: SDUPTHER

## 2021-09-02 NOTE — TELEPHONE ENCOUNTER
Patient called asking for Rx for allergies, she don't remember what she had in the past,  She states her head is full.     Last seen 6/14/2021  Next appt 9/21/2021    Maksim Boyd

## 2021-09-13 RX ORDER — TIZANIDINE 4 MG/1
TABLET ORAL
Qty: 60 TABLET | Refills: 1 | Status: SHIPPED
Start: 2021-09-13 | End: 2021-09-21 | Stop reason: SDUPTHER

## 2021-09-21 ENCOUNTER — OFFICE VISIT (OUTPATIENT)
Dept: FAMILY MEDICINE CLINIC | Age: 76
End: 2021-09-21
Payer: MEDICARE

## 2021-09-21 VITALS
WEIGHT: 191 LBS | TEMPERATURE: 95.7 F | BODY MASS INDEX: 31.78 KG/M2 | OXYGEN SATURATION: 97 % | DIASTOLIC BLOOD PRESSURE: 82 MMHG | HEART RATE: 64 BPM | SYSTOLIC BLOOD PRESSURE: 130 MMHG

## 2021-09-21 DIAGNOSIS — R63.4 WEIGHT LOSS: ICD-10-CM

## 2021-09-21 DIAGNOSIS — E78.5 HYPERLIPIDEMIA, UNSPECIFIED HYPERLIPIDEMIA TYPE: ICD-10-CM

## 2021-09-21 DIAGNOSIS — G89.29 CHRONIC LEFT SHOULDER PAIN: Primary | ICD-10-CM

## 2021-09-21 DIAGNOSIS — J02.0 ACUTE STREPTOCOCCAL PHARYNGITIS: ICD-10-CM

## 2021-09-21 DIAGNOSIS — G25.0 BENIGN ESSENTIAL TREMOR: ICD-10-CM

## 2021-09-21 DIAGNOSIS — F32.A DEPRESSION, UNSPECIFIED DEPRESSION TYPE: ICD-10-CM

## 2021-09-21 DIAGNOSIS — R10.9 ABDOMINAL PAIN, UNSPECIFIED ABDOMINAL LOCATION: ICD-10-CM

## 2021-09-21 DIAGNOSIS — M25.571 CHRONIC PAIN OF RIGHT ANKLE: ICD-10-CM

## 2021-09-21 DIAGNOSIS — G89.29 CHRONIC PAIN OF RIGHT ANKLE: ICD-10-CM

## 2021-09-21 DIAGNOSIS — M25.512 CHRONIC LEFT SHOULDER PAIN: Primary | ICD-10-CM

## 2021-09-21 PROCEDURE — 1090F PRES/ABSN URINE INCON ASSESS: CPT | Performed by: INTERNAL MEDICINE

## 2021-09-21 PROCEDURE — G8427 DOCREV CUR MEDS BY ELIG CLIN: HCPCS | Performed by: INTERNAL MEDICINE

## 2021-09-21 PROCEDURE — 99214 OFFICE O/P EST MOD 30 MIN: CPT | Performed by: INTERNAL MEDICINE

## 2021-09-21 PROCEDURE — 3017F COLORECTAL CA SCREEN DOC REV: CPT | Performed by: INTERNAL MEDICINE

## 2021-09-21 PROCEDURE — 1123F ACP DISCUSS/DSCN MKR DOCD: CPT | Performed by: INTERNAL MEDICINE

## 2021-09-21 PROCEDURE — G8400 PT W/DXA NO RESULTS DOC: HCPCS | Performed by: INTERNAL MEDICINE

## 2021-09-21 PROCEDURE — 4040F PNEUMOC VAC/ADMIN/RCVD: CPT | Performed by: INTERNAL MEDICINE

## 2021-09-21 PROCEDURE — 1036F TOBACCO NON-USER: CPT | Performed by: INTERNAL MEDICINE

## 2021-09-21 PROCEDURE — G8417 CALC BMI ABV UP PARAM F/U: HCPCS | Performed by: INTERNAL MEDICINE

## 2021-09-21 RX ORDER — AZITHROMYCIN 250 MG/1
250 TABLET, FILM COATED ORAL SEE ADMIN INSTRUCTIONS
Qty: 6 TABLET | Refills: 0 | Status: SHIPPED | OUTPATIENT
Start: 2021-09-21 | End: 2021-09-26

## 2021-09-21 RX ORDER — AMLODIPINE BESYLATE 5 MG/1
5 TABLET ORAL DAILY
Qty: 90 TABLET | Refills: 1 | Status: SHIPPED
Start: 2021-09-21 | End: 2022-01-10 | Stop reason: SDUPTHER

## 2021-09-21 RX ORDER — BUPROPION HYDROCHLORIDE 300 MG/1
300 TABLET ORAL EVERY MORNING
Qty: 90 TABLET | Refills: 1 | Status: SHIPPED
Start: 2021-09-21 | End: 2022-01-10 | Stop reason: SDUPTHER

## 2021-09-21 RX ORDER — PROPRANOLOL HCL 60 MG
60 CAPSULE, EXTENDED RELEASE 24HR ORAL DAILY
Qty: 90 CAPSULE | Refills: 1 | Status: SHIPPED
Start: 2021-09-21 | End: 2022-01-10 | Stop reason: SDUPTHER

## 2021-09-21 RX ORDER — ERGOCALCIFEROL 1.25 MG/1
CAPSULE ORAL
Qty: 6 CAPSULE | Refills: 1 | Status: SHIPPED
Start: 2021-09-21 | End: 2022-01-10 | Stop reason: SDUPTHER

## 2021-09-21 RX ORDER — PRAVASTATIN SODIUM 10 MG
10 TABLET ORAL DAILY
Qty: 90 TABLET | Refills: 1 | Status: SHIPPED
Start: 2021-09-21 | End: 2021-11-03 | Stop reason: SDUPTHER

## 2021-09-21 RX ORDER — HYDROCODONE BITARTRATE AND ACETAMINOPHEN 5; 325 MG/1; MG/1
1 TABLET ORAL EVERY 8 HOURS PRN
Qty: 30 TABLET | Refills: 0 | Status: SHIPPED
Start: 2021-09-21 | End: 2022-01-10 | Stop reason: SDUPTHER

## 2021-09-21 RX ORDER — CETIRIZINE HYDROCHLORIDE 10 MG/1
10 TABLET ORAL DAILY
Qty: 30 TABLET | Refills: 1 | Status: SHIPPED
Start: 2021-09-21 | End: 2022-01-10 | Stop reason: SDUPTHER

## 2021-09-21 RX ORDER — TIZANIDINE 4 MG/1
TABLET ORAL
Qty: 60 TABLET | Refills: 1 | Status: SHIPPED
Start: 2021-09-21 | End: 2022-01-10 | Stop reason: SDUPTHER

## 2021-09-21 RX ORDER — IPRATROPIUM BROMIDE 42 UG/1
2 SPRAY, METERED NASAL 2 TIMES DAILY
Qty: 15 ML | Refills: 2 | Status: SHIPPED
Start: 2021-09-21 | End: 2022-01-10 | Stop reason: SDUPTHER

## 2021-09-21 RX ORDER — OMEPRAZOLE 40 MG/1
40 CAPSULE, DELAYED RELEASE ORAL DAILY
Qty: 90 CAPSULE | Refills: 1 | Status: SHIPPED
Start: 2021-09-21 | End: 2022-01-10 | Stop reason: SDUPTHER

## 2021-09-21 NOTE — PROGRESS NOTES
Subjective:     Chief Complaint   Patient presents with    3 Month Follow-Up   Complains of sore throat for the past 3 days right ear hurts,    Has pain in the left shoulder which is chronic, right ankle pain which is chronic when she walks a lot but does more physical work she gets pain Tylenol does not help Advil does not help she takes a Norco once in a while 30 tablets lasts her long time    She has lost some weight over the past few months  She gets full easily    Denies any abdominal pain    Still tremors responding to medication    Has depression which is controlled    Past Medical History:   Diagnosis Date    Angioma     brain    Anxiety     Ataxia     Cerebrovascular accident (CVA) ([address]     Per patient at age 6-9   [facility] Cerebrovascular disease     Chronic back pain     GERD (gastroesophageal reflux disease)     Headache     Hyperlipidemia         Social History     Socioeconomic History    Marital status: Single     Spouse name: Not on file    Number of children: Not on file    Years of education: Not on file    Highest education level: Not on file   Occupational History    Not on file   Tobacco Use    Smoking status: Never Smoker    Smokeless tobacco: Never Used   Substance and Sexual Activity    Alcohol use: Never    Drug use: Never    Sexual activity: Not on file   Other Topics Concern    Not on file   Social History Narrative    Not on file     Social Determinants of Health     Financial Resource Strain:     Difficulty of Paying Living Expenses:    Food Insecurity: No Food Insecurity    Worried About Running Out of Food in the Last Year: Never true    Pallavi of Food in the Last Year: Never true   Transportation Needs: No Transportation Needs    Lack of Transportation (Medical): No    Lack of Transportation (Non-Medical):  No   Physical Activity:     Days of Exercise per Week:     Minutes of Exercise per Session:    Stress:     Feeling of Stress :    Social Connections:     Frequency of Communication with Friends and Family:     Frequency of Social Gatherings with Friends and Family:     Attends Oriental orthodox Services:     Active Member of Clubs or Organizations:     Attends Club or Organization Meetings:     Marital Status:    Intimate Partner Violence:     Fear of Current or Ex-Partner:     Emotionally Abused:     Physically Abused:     Sexually Abused:         Past Surgical History:   Procedure Laterality Date    BRAIN SURGERY  07/2010    Cerebellar Cavernous Angioma resection     BREAST CYST ASPIRATION      BREAST REDUCTION SURGERY Bilateral 1993    HAND SURGERY Left     trapezius removal     HYSTERECTOMY, TOTAL ABDOMINAL  1979    SALIVARY GLAND SURGERY Left 1992    TONSILLECTOMY          Family History   Problem Relation Age of Onset    Heart Attack Father 61        Allergies   Allergen Reactions    Aspirin     Codeine Hives    Linzess [Linaclotide]      crasmps        ROS  No acute distress  Cardiac: Denies any chest pain or palpitation  Denies any chest pain, no angina no exertional dyspnea  Respiratory: Denies any cough or shortness of breath  GI: No abdominal pain. Denies any nausea vomiting or diarrhea  Has declined colonoscopy, declining stool cards, declined Cologuard,  : Denies any dysuria frequency or hematuria  Neuro: No headache or dizziness  Chronic ataxia and tremors has been evaluated by neurologist doing better with the propranolol  For chronic ataxia she had extensive testing done in [place]  Endocrine: No diabetes  Skin: normal  No recent weight gain or weight loss  Denies any change in vision    Objective:    /82   Pulse 64   Temp 95.7 °F (35.4 °C)   Wt 191 lb (86.6 kg)   SpO2 97%   BMI 31.78 kg/m²   Throat mild erythema  Right ear slight redness  Constitutional: Alert awake and oriented  Eyes: Pupils equal bilaterally. Extraocular muscles intact  Neck: no JVD adenopathy no bruit  Heart:  RRR, no murmurs, gallops, or rubs.   Lungs: no wheeze, rales or rhonchi  Abd: bowel sounds present, nontender, nondistended, no masses  Extrem:  No clubbing, cyanosis, or edema  Neuro: AAOx3,No Focal deficit  Walks with a cane  No recent fall  Psychological: Has chronic depression anxiety responding to Wellbutrin      Current Outpatient Medications   Medication Sig Dispense Refill    amLODIPine (NORVASC) 5 MG tablet Take 1 tablet by mouth daily 90 tablet 1    buPROPion (WELLBUTRIN XL) 300 MG extended release tablet Take 1 tablet by mouth every morning 90 tablet 1    cetirizine (ZYRTEC) 10 MG tablet Take 1 tablet by mouth daily 30 tablet 1    ipratropium (ATROVENT) 0.06 % nasal spray 2 sprays by Each Nostril route 2 times daily 15 mL 2    omeprazole (PRILOSEC) 40 MG delayed release capsule Take 1 capsule by mouth daily 90 capsule 1    pravastatin (PRAVACHOL) 10 MG tablet Take 1 tablet by mouth daily 90 tablet 1    propranolol (INDERAL LA) 60 MG extended release capsule Take 1 capsule by mouth daily 90 capsule 1    tiZANidine (ZANAFLEX) 4 MG tablet TAKE 1 TABLET BY MOUTH TWICE DAILY 60 tablet 1    vitamin D (ERGOCALCIFEROL) 1.25 MG (35379 UT) CAPS capsule TAKE 1 CAPSULE BY MOUTH EVERY 2 WEEKS 6 capsule 1    HYDROcodone-acetaminophen (NORCO) 5-325 MG per tablet Take 1 tablet by mouth every 8 hours as needed for Pain for up to 30 days. 30 tablet 0    azithromycin (ZITHROMAX) 250 MG tablet Take 1 tablet by mouth See Admin Instructions for 5 days 500mg on day 1 followed by 250mg on days 2 - 5 6 tablet 0    Cholecalciferol (VITAMIN D3) 1.25 MG (14154 UT) CAPS Take 1 capsule by mouth once a week      Handicap Placard [facility] [place] 2/11/2026 1 each 0     No current facility-administered medications for this visit.         Last 3 BMP  Lab Results   Component Value Date/Time     09/13/2021 09:20 AM     08/16/2017 09:30 AM     04/08/2016 08:34 AM    K 5.0 09/13/2021 09:20 AM    K 4.0 08/16/2017 09:30 AM    K 4.7 04/08/2016 08:34 AM     09/13/2021 09:20 AM    CL 99 08/16/2017 09:30 AM     04/08/2016 08:34 AM    CO2 24 09/13/2021 09:20 AM    CO2 23 08/16/2017 09:30 AM    CO2 25 04/08/2016 08:34 AM    BUN 18 09/13/2021 09:20 AM    BUN 14 08/16/2017 09:30 AM    BUN 17 04/08/2016 08:34 AM    CREATININE 0.9 09/13/2021 09:20 AM    CREATININE 0.9 08/16/2017 09:30 AM    CREATININE 0.8 04/08/2016 08:34 AM    GLUCOSE 103 (H) 09/13/2021 09:20 AM    GLUCOSE 98 08/16/2017 09:30 AM    GLUCOSE 103 04/08/2016 08:34 AM    GLUCOSE 93 05/09/2012 08:56 AM    GLUCOSE 103 08/05/2011 09:50 AM    CALCIUM 9.7 09/13/2021 09:20 AM    CALCIUM 9.6 08/16/2017 09:30 AM    CALCIUM 9.9 04/08/2016 08:34 AM       Last 3 CMP:    Lab Results   Component Value Date/Time     09/13/2021 09:20 AM     08/16/2017 09:30 AM     04/08/2016 08:34 AM    K 5.0 09/13/2021 09:20 AM    K 4.0 08/16/2017 09:30 AM    K 4.7 04/08/2016 08:34 AM     09/13/2021 09:20 AM    CL 99 08/16/2017 09:30 AM     04/08/2016 08:34 AM    CO2 24 09/13/2021 09:20 AM    CO2 23 08/16/2017 09:30 AM    CO2 25 04/08/2016 08:34 AM    BUN 18 09/13/2021 09:20 AM    BUN 14 08/16/2017 09:30 AM    BUN 17 04/08/2016 08:34 AM    CREATININE 0.9 09/13/2021 09:20 AM    CREATININE 0.9 08/16/2017 09:30 AM    CREATININE 0.8 04/08/2016 08:34 AM    GLUCOSE 103 (H) 09/13/2021 09:20 AM    GLUCOSE 98 08/16/2017 09:30 AM    GLUCOSE 103 04/08/2016 08:34 AM    GLUCOSE 93 05/09/2012 08:56 AM    GLUCOSE 103 08/05/2011 09:50 AM    CALCIUM 9.7 09/13/2021 09:20 AM    CALCIUM 9.6 08/16/2017 09:30 AM    CALCIUM 9.9 04/08/2016 08:34 AM    PROT 7.1 09/13/2021 09:20 AM    PROT 6.8 08/16/2017 09:30 AM    PROT 7.5 04/08/2016 08:34 AM    LABALBU 3.8 09/13/2021 09:20 AM    LABALBU 4.1 08/16/2017 09:30 AM    LABALBU 4.3 04/08/2016 08:34 AM    LABALBU 4.2 05/09/2012 08:56 AM    LABALBU 4.2 08/05/2011 09:50 AM    BILITOT 0.4 09/13/2021 09:20 AM    BILITOT 0.4 08/16/2017 09:30 AM    BILITOT 0.4 04/08/2016 08:34 AM    ALKPHOS 87 09/13/2021 09:20 AM    ALKPHOS 98 08/16/2017 09:30 AM    ALKPHOS 87 04/08/2016 08:34 AM    AST 24 09/13/2021 09:20 AM    AST 17 08/16/2017 09:30 AM    AST 21 04/08/2016 08:34 AM    ALT 18 09/13/2021 09:20 AM    ALT 16 08/16/2017 09:30 AM    ALT 25 04/08/2016 08:34 AM        CBC:   Lab Results   Component Value Date/Time    WBC 7.0 08/16/2017 09:30 AM    RBC 4.88 08/16/2017 09:30 AM    HGB 14.4 08/16/2017 09:30 AM    HCT 45.2 08/16/2017 09:30 AM    MCV 92.6 08/16/2017 09:30 AM    MCH 29.5 08/16/2017 09:30 AM    MCHC 31.9 (L) 08/16/2017 09:30 AM    RDW 14.3 08/16/2017 09:30 AM     08/16/2017 09:30 AM    MPV 10.4 08/16/2017 09:30 AM       A1C:  Lab Results   Component Value Date/Time    LABA1C 5.7 (H) 09/13/2021 09:20 AM       Lipid panel:  Lab Results   Component Value Date    CHOL 223 09/13/2021    CHOL 203 01/16/2020    CHOL 237 08/16/2017    TRIG 130 09/13/2021    TRIG 137 01/16/2020    TRIG 90 08/16/2017    HDL 73 09/13/2021    HDL 84 01/16/2020    HDL 87 08/16/2017        Lab Results   Component Value Date/Time    PROT 7.1 09/13/2021 09:20 AM    PROT 6.8 08/16/2017 09:30 AM    PROT 7.5 04/08/2016 08:34 AM       No results found for: MG      Assessment. Nasreen Russell was seen today for 3 month follow-up. Diagnoses and all orders for this visit:    Chronic left shoulder pain  -     HYDROcodone-acetaminophen (NORCO) 5-325 MG per tablet; Take 1 tablet by mouth every 8 hours as needed for Pain for up to 30 days. Chronic pain of right ankle  -     HYDROcodone-acetaminophen (NORCO) 5-325 MG per tablet; Take 1 tablet by mouth every 8 hours as needed for Pain for up to 30 days. Acute streptococcal pharyngitis  -     azithromycin (ZITHROMAX) 250 MG tablet; Take 1 tablet by mouth See Admin Instructions for 5 days 500mg on day 1 followed by 250mg on days 2 - 5    Weight loss  -     US LIVER; Future  -     XR CHEST (2 VW);  Future    Abdominal pain, unspecified abdominal location  -     US LIVER; Future    Hyperlipidemia, unspecified hyperlipidemia type    Benign essential tremor    Depression, unspecified depression type    Other orders  -     amLODIPine (NORVASC) 5 MG tablet; Take 1 tablet by mouth daily  -     buPROPion (WELLBUTRIN XL) 300 MG extended release tablet; Take 1 tablet by mouth every morning  -     cetirizine (ZYRTEC) 10 MG tablet; Take 1 tablet by mouth daily  -     ipratropium (ATROVENT) 0.06 % nasal spray; 2 sprays by Each Nostril route 2 times daily  -     omeprazole (PRILOSEC) 40 MG delayed release capsule; Take 1 capsule by mouth daily  -     pravastatin (PRAVACHOL) 10 MG tablet; Take 1 tablet by mouth daily  -     propranolol (INDERAL LA) 60 MG extended release capsule;  Take 1 capsule by mouth daily  -     tiZANidine (ZANAFLEX) 4 MG tablet; TAKE 1 TABLET BY MOUTH TWICE DAILY  -     vitamin D (ERGOCALCIFEROL) 1.25 MG (19896 UT) CAPS capsule; TAKE 1 CAPSULE BY MOUTH EVERY 2 WEEKS       Patient Active Problem List   Diagnosis    Ataxia    Benign essential tremor    Depression    Hyperlipidemia       Plan: Pharyngitis with otitis Z-Dl as directed Mucinex over-the-counter    Chronic left shoulder pain and right ankle pain with intermittent flareup she takes Norco occasionally prescription for 30 tablets given      Weight loss etiology not clear get a chest x-ray, ultrasound right upper quadrant to assess the liver, and pancreas,  Follow-up in 1 month, she was 205 pounds few months ago now she is 191 pounds      Hyperlipidemia cholesterol 223,  she is taking pravastatin 10 mg declined to increase the dose she will do a better job with the diet      Benign essential tremors responding to propranolol    Pression controlled with Wellbutrin    Living will was discussed,  She has power of   Janine Carrasquillo [facility] [place][phone]    CODE STATUS was discussed she  Does not want intubation no CPR no shock    Living will was reviewed see a copy of the chart    She does not want to live in a vegetative state    Follow back in 1 month monitor the weight        Return in about 4 weeks (around 10/19/2021).        Clearance MD Ajay  12:51 PM  9/21/2021     DE

## 2021-09-23 ENCOUNTER — TELEPHONE (OUTPATIENT)
Dept: FAMILY MEDICINE CLINIC | Age: 76
End: 2021-09-23

## 2021-09-23 DIAGNOSIS — R91.1 LUNG NODULE: ICD-10-CM

## 2021-09-23 DIAGNOSIS — R93.2 ABNORMAL ULTRASOUND OF GALLBLADDER: ICD-10-CM

## 2021-09-23 DIAGNOSIS — R63.4 WEIGHT LOSS: Primary | ICD-10-CM

## 2021-09-23 NOTE — TELEPHONE ENCOUNTER
X-ray report and ultrasound report discussed  X-ray chest showed x-ray chest showed lung nodule CT chest noncontrast ultrasound right upper quadrant multiple gallstones, gas in the duodenum, weight loss,    Referral to Dr. Hawely Prior    CT chest noncontrast    Patient to call me in 2 weeks if she does not hear for the CAT scan or GI

## 2021-10-04 ENCOUNTER — TELEPHONE (OUTPATIENT)
Dept: FAMILY MEDICINE CLINIC | Age: 76
End: 2021-10-04

## 2021-10-04 NOTE — TELEPHONE ENCOUNTER
Patient called trying to set up ofe for the third covid vaccine, she was asking which one she wanted to get the regular booster or the one for comprised immune system. Please advise. She also is asking should she get the pneumonia vaccine at the same time?        Last seen 9/21/2021  Next appt 11/3/2021

## 2021-10-05 DIAGNOSIS — J84.10 PULMONARY FIBROSIS (HCC): Primary | ICD-10-CM

## 2021-10-05 DIAGNOSIS — R63.4 WEIGHT LOSS: ICD-10-CM

## 2021-10-12 ENCOUNTER — TELEPHONE (OUTPATIENT)
Dept: FAMILY MEDICINE CLINIC | Age: 76
End: 2021-10-12

## 2021-10-12 NOTE — TELEPHONE ENCOUNTER
Called pt and advised her referral faxed to office and provided her with phone number if she'd like to call herself to schedule.

## 2021-10-12 NOTE — TELEPHONE ENCOUNTER
----- Message from Irma Ireland sent at 10/12/2021  9:06 AM EDT -----  Subject: Referral Request    QUESTIONS   Reason for referral request? pulmonologist   Has the physician seen you for this condition before? No   Preferred Specialist (if applicable)? Do you already have an appointment scheduled? No  Additional Information for Provider? Patient stated that she was told she   would have a referral for a pulmonologist but she has not received one.   ---------------------------------------------------------------------------  --------------  [address]  What is the best way for the office to contact you? OK to leave message on   voicemail  Preferred Call Back Phone Number?  [phone]

## 2021-10-15 ENCOUNTER — TELEPHONE (OUTPATIENT)
Dept: FAMILY MEDICINE CLINIC | Age: 76
End: 2021-10-15

## 2021-10-15 DIAGNOSIS — J84.9 INTERSTITIAL LUNG DISEASE (HCC): Primary | ICD-10-CM

## 2021-10-15 NOTE — TELEPHONE ENCOUNTER
----- Message from Shahrzad Floresita sent at 10/15/2021  9:17 AM EDT -----  Subject: Referral Request    QUESTIONS   Reason for referral request? referral to doctor in [facility], needs new   referral to a doctor in [place], closer to home, pulmonary referral/ lung   doctor   Has the physician seen you for this condition before? Yes  Select a date? 2021-09-30  Select the Provider the patient wants to be referred to, if known (PCP or   Specialist)? Outside Physician - unknown   Preferred Specialist (if applicable)? Do you already have an appointment scheduled? No  Additional Information for Provider? referral to doctor in [facility], needs   new referral to a doctor in [place], closer to home, pulmonary referral/   lung doctor   ---------------------------------------------------------------------------  --------------  [address]  What is the best way for the office to contact you? OK to leave message on   voicemail  Preferred Call Back Phone Number?  [phone]

## 2021-10-15 NOTE — TELEPHONE ENCOUNTER
I spoke to patient, she declined to go to [facility] to see a pulmonologist, CT scan showed interstitial lung disease, refer to Dr. Maricarmen Bowens as per patient request

## 2021-11-03 ENCOUNTER — OFFICE VISIT (OUTPATIENT)
Dept: FAMILY MEDICINE CLINIC | Age: 76
End: 2021-11-03
Payer: MEDICARE

## 2021-11-03 VITALS
OXYGEN SATURATION: 95 % | HEART RATE: 69 BPM | SYSTOLIC BLOOD PRESSURE: 136 MMHG | DIASTOLIC BLOOD PRESSURE: 82 MMHG | WEIGHT: 207 LBS | BODY MASS INDEX: 34.45 KG/M2

## 2021-11-03 DIAGNOSIS — M75.52 BURSITIS OF LEFT SHOULDER: Primary | ICD-10-CM

## 2021-11-03 DIAGNOSIS — Z23 NEED FOR INFLUENZA VACCINATION: ICD-10-CM

## 2021-11-03 DIAGNOSIS — E78.5 HYPERLIPIDEMIA, UNSPECIFIED HYPERLIPIDEMIA TYPE: ICD-10-CM

## 2021-11-03 DIAGNOSIS — G25.0 BENIGN ESSENTIAL TREMOR: ICD-10-CM

## 2021-11-03 DIAGNOSIS — M25.571 CHRONIC PAIN OF RIGHT ANKLE: ICD-10-CM

## 2021-11-03 DIAGNOSIS — R63.4 WEIGHT LOSS: ICD-10-CM

## 2021-11-03 DIAGNOSIS — G89.29 CHRONIC PAIN OF RIGHT ANKLE: ICD-10-CM

## 2021-11-03 DIAGNOSIS — R27.0 ATAXIA: ICD-10-CM

## 2021-11-03 DIAGNOSIS — F32.A DEPRESSION, UNSPECIFIED DEPRESSION TYPE: ICD-10-CM

## 2021-11-03 PROCEDURE — 1090F PRES/ABSN URINE INCON ASSESS: CPT | Performed by: INTERNAL MEDICINE

## 2021-11-03 PROCEDURE — 1036F TOBACCO NON-USER: CPT | Performed by: INTERNAL MEDICINE

## 2021-11-03 PROCEDURE — 90694 VACC AIIV4 NO PRSRV 0.5ML IM: CPT | Performed by: INTERNAL MEDICINE

## 2021-11-03 PROCEDURE — 1123F ACP DISCUSS/DSCN MKR DOCD: CPT | Performed by: INTERNAL MEDICINE

## 2021-11-03 PROCEDURE — G0008 ADMIN INFLUENZA VIRUS VAC: HCPCS | Performed by: INTERNAL MEDICINE

## 2021-11-03 PROCEDURE — G8484 FLU IMMUNIZE NO ADMIN: HCPCS | Performed by: INTERNAL MEDICINE

## 2021-11-03 PROCEDURE — G8427 DOCREV CUR MEDS BY ELIG CLIN: HCPCS | Performed by: INTERNAL MEDICINE

## 2021-11-03 PROCEDURE — 4040F PNEUMOC VAC/ADMIN/RCVD: CPT | Performed by: INTERNAL MEDICINE

## 2021-11-03 PROCEDURE — G8417 CALC BMI ABV UP PARAM F/U: HCPCS | Performed by: INTERNAL MEDICINE

## 2021-11-03 PROCEDURE — 99213 OFFICE O/P EST LOW 20 MIN: CPT | Performed by: INTERNAL MEDICINE

## 2021-11-03 PROCEDURE — 3017F COLORECTAL CA SCREEN DOC REV: CPT | Performed by: INTERNAL MEDICINE

## 2021-11-03 PROCEDURE — G8400 PT W/DXA NO RESULTS DOC: HCPCS | Performed by: INTERNAL MEDICINE

## 2021-11-03 RX ORDER — METHYLPREDNISOLONE 4 MG/1
TABLET ORAL
Qty: 1 KIT | Refills: 0 | Status: SHIPPED | OUTPATIENT
Start: 2021-11-03 | End: 2021-11-09

## 2021-11-03 RX ORDER — PRAVASTATIN SODIUM 20 MG
10 TABLET ORAL DAILY
Qty: 90 TABLET | Refills: 1 | Status: SHIPPED
Start: 2021-11-03 | End: 2021-11-04 | Stop reason: SDUPTHER

## 2021-11-03 NOTE — PROGRESS NOTES
Subjective:     Chief Complaint   Patient presents with    Other     Pain left shoulder   Complains of pain in the left shoulder cannot reach of the head    Follow-up on the weight loss  She has gained about 16 pounds and she is about her baseline,  She was very active last time, she is not active and she has gained weight    She had ultrasound right upper quadrant and a chest x-ray  It was followed by CT chest, subtle basilar lung density asymmetric to the right suggestive of interstitial lung disease she was referred to Dr. Wali Montalvo, she got a call yesterday she will make an appointment    Ultrasound right upper quadrant showed echogenic foci in the gallbladder with multiple stones    She was seen by Dr. Andrade Field they did not recommend any scope at this time  She will follow with them    Denies any abdominal    Past Medical History:   Diagnosis Date    Angioma     brain    Anxiety     Ataxia     Cerebrovascular accident (CVA) ([facility] [address]     Per patient at age 6-9   [place] Cerebrovascular disease     Chronic back pain     GERD (gastroesophageal reflux disease)     Headache     Hyperlipidemia         Social History     Socioeconomic History    Marital status: Single     Spouse name: Not on file    Number of children: Not on file    Years of education: Not on file    Highest education level: Not on file   Occupational History    Not on file   Tobacco Use    Smoking status: Never Smoker    Smokeless tobacco: Never Used   Substance and Sexual Activity    Alcohol use: Never    Drug use: Never    Sexual activity: Not on file   Other Topics Concern    Not on file   Social History Narrative    Not on file     Social Determinants of Health     Financial Resource Strain:     Difficulty of Paying Living Expenses:    Food Insecurity: No Food Insecurity    Worried About Running Out of Food in the Last Year: Never true    Pallavi of Food in the Last Year: Never true   Transportation Needs: No Transportation Needs    Lack of Transportation (Medical): No    Lack of Transportation (Non-Medical): No   Physical Activity:     Days of Exercise per Week:     Minutes of Exercise per Session:    Stress:     Feeling of Stress :    Social Connections:     Frequency of Communication with Friends and Family:     Frequency of Social Gatherings with Friends and Family:     Attends Confucianist Services:     Active Member of Clubs or Organizations:     Attends Club or Organization Meetings:     Marital Status:    Intimate Partner Violence:     Fear of Current or Ex-Partner:     Emotionally Abused:     Physically Abused:     Sexually Abused:         Past Surgical History:   Procedure Laterality Date    BRAIN SURGERY  07/2010    Cerebellar Cavernous Angioma resection     BREAST CYST ASPIRATION      BREAST REDUCTION SURGERY Bilateral 1993    HAND SURGERY Left     trapezius removal     HYSTERECTOMY, TOTAL ABDOMINAL  1979    SALIVARY GLAND SURGERY Left 1992    TONSILLECTOMY          Family History   Problem Relation Age of Onset    Heart Attack Father 61        Allergies   Allergen Reactions    Aspirin     Codeine Hives    Linzess [Linaclotide]      crasmps        ROS  No acute distress  Cardiac: Denies any chest pain or palpitation  Denies any chest pain or angina  No exertional dyspnea  Respiratory: Denies any cough or shortness of breath  CT chest done September 2021  Interstitial lung disease  Referred to Dr. Segundo Pepper  She received a call yesterday  She will make appointment  GI: No abdominal pain.  Denies any nausea vomiting or diarrhea  Denies any dysphagia, good appetite, no change in bowel habits,  She has declined colonoscopy  Declined stool cards or Cologuard  Seen by Dr. Dania Estrada few weeks ago did not recommend any further work-up as per patient  : Denies any dysuria frequency or hematuria  Neuro: No headache or dizziness  Chronic ataxia extensive testing including clinic no further suggestions    Has been seen by neurologist    Tremors well controlled with the propranolol  Endocrine: No diabetes  Skin: normal  No recent weight gain or weight loss  Denies any change in vision    Objective:    /82   Pulse 69   Wt 207 lb (93.9 kg)   SpO2 95%   BMI 34.45 kg/m²     Constitutional: Alert awake and oriented  Eyes: Pupils equal bilaterally. Extraocular muscles intact  Neck: no JVD adenopathy no bruit  Heart:  RRR, no murmurs, gallops, or rubs. Lungs:    no wheeze, rales or rhonchi  Abd: bowel sounds present, nontender, nondistended, no masses  Extrem:  No clubbing, cyanosis, or edema  Neuro: AAOx3,No Focal deficit  Psychological: no depression or anxiety     Range of motion left shoulder painful  Tenderness left upper shoulder with deep palpation  Current Outpatient Medications   Medication Sig Dispense Refill    methylPREDNISolone (MEDROL DOSEPACK) 4 MG tablet Take by mouth. 1 kit 0    pravastatin (PRAVACHOL) 20 MG tablet Take 0.5 tablets by mouth daily 90 tablet 1    amLODIPine (NORVASC) 5 MG tablet Take 1 tablet by mouth daily 90 tablet 1    buPROPion (WELLBUTRIN XL) 300 MG extended release tablet Take 1 tablet by mouth every morning 90 tablet 1    ipratropium (ATROVENT) 0.06 % nasal spray 2 sprays by Each Nostril route 2 times daily 15 mL 2    omeprazole (PRILOSEC) 40 MG delayed release capsule Take 1 capsule by mouth daily 90 capsule 1    propranolol (INDERAL LA) 60 MG extended release capsule Take 1 capsule by mouth daily 90 capsule 1    tiZANidine (ZANAFLEX) 4 MG tablet TAKE 1 TABLET BY MOUTH TWICE DAILY 60 tablet 1    vitamin D (ERGOCALCIFEROL) 1.25 MG (86348 UT) CAPS capsule TAKE 1 CAPSULE BY MOUTH EVERY 2 WEEKS 6 capsule 1    Handicap Placard MISC UNTIL 2/11/2026 1 each 0    Cholecalciferol (VITAMIN D3) 1.25 MG (50035 UT) CAPS Take 1 capsule by mouth once a week       No current facility-administered medications for this visit.         Last 3 BMP  Lab Results   Component Value Date/Time     09/13/2021 09:20 AM     08/16/2017 09:30 AM     04/08/2016 08:34 AM    K 5.0 09/13/2021 09:20 AM    K 4.0 08/16/2017 09:30 AM    K 4.7 04/08/2016 08:34 AM     09/13/2021 09:20 AM    CL 99 08/16/2017 09:30 AM     04/08/2016 08:34 AM    CO2 24 09/13/2021 09:20 AM    CO2 23 08/16/2017 09:30 AM    CO2 25 04/08/2016 08:34 AM    BUN 18 09/13/2021 09:20 AM    BUN 14 08/16/2017 09:30 AM    BUN 17 04/08/2016 08:34 AM    CREATININE 0.9 09/13/2021 09:20 AM    CREATININE 0.9 08/16/2017 09:30 AM    CREATININE 0.8 04/08/2016 08:34 AM    GLUCOSE 103 (H) 09/13/2021 09:20 AM    GLUCOSE 98 08/16/2017 09:30 AM    GLUCOSE 103 04/08/2016 08:34 AM    GLUCOSE 93 05/09/2012 08:56 AM    GLUCOSE 103 08/05/2011 09:50 AM    CALCIUM 9.7 09/13/2021 09:20 AM    CALCIUM 9.6 08/16/2017 09:30 AM    CALCIUM 9.9 04/08/2016 08:34 AM       Last 3 CMP:    Lab Results   Component Value Date/Time     09/13/2021 09:20 AM     08/16/2017 09:30 AM     04/08/2016 08:34 AM    K 5.0 09/13/2021 09:20 AM    K 4.0 08/16/2017 09:30 AM    K 4.7 04/08/2016 08:34 AM     09/13/2021 09:20 AM    CL 99 08/16/2017 09:30 AM     04/08/2016 08:34 AM    CO2 24 09/13/2021 09:20 AM    CO2 23 08/16/2017 09:30 AM    CO2 25 04/08/2016 08:34 AM    BUN 18 09/13/2021 09:20 AM    BUN 14 08/16/2017 09:30 AM    BUN 17 04/08/2016 08:34 AM    CREATININE 0.9 09/13/2021 09:20 AM    CREATININE 0.9 08/16/2017 09:30 AM    CREATININE 0.8 04/08/2016 08:34 AM    GLUCOSE 103 (H) 09/13/2021 09:20 AM    GLUCOSE 98 08/16/2017 09:30 AM    GLUCOSE 103 04/08/2016 08:34 AM    GLUCOSE 93 05/09/2012 08:56 AM    GLUCOSE 103 08/05/2011 09:50 AM    CALCIUM 9.7 09/13/2021 09:20 AM    CALCIUM 9.6 08/16/2017 09:30 AM    CALCIUM 9.9 04/08/2016 08:34 AM    PROT 7.1 09/13/2021 09:20 AM    PROT 6.8 08/16/2017 09:30 AM    PROT 7.5 04/08/2016 08:34 AM    LABALBU 3.8 09/13/2021 09:20 AM    LABALBU 4.1 08/16/2017 09:30 AM    LABALBU 4.3 04/08/2016 08:34 AM    LABALBU 4.2 05/09/2012 08:56 AM    LABALBU 4.2 08/05/2011 09:50 AM    BILITOT 0.4 09/13/2021 09:20 AM    BILITOT 0.4 08/16/2017 09:30 AM    BILITOT 0.4 04/08/2016 08:34 AM    ALKPHOS 87 09/13/2021 09:20 AM    ALKPHOS 98 08/16/2017 09:30 AM    ALKPHOS 87 04/08/2016 08:34 AM    AST 24 09/13/2021 09:20 AM    AST 17 08/16/2017 09:30 AM    AST 21 04/08/2016 08:34 AM    ALT 18 09/13/2021 09:20 AM    ALT 16 08/16/2017 09:30 AM    ALT 25 04/08/2016 08:34 AM        CBC:   Lab Results   Component Value Date/Time    WBC 7.0 08/16/2017 09:30 AM    RBC 4.88 08/16/2017 09:30 AM    HGB 14.4 08/16/2017 09:30 AM    HCT 45.2 08/16/2017 09:30 AM    MCV 92.6 08/16/2017 09:30 AM    MCH 29.5 08/16/2017 09:30 AM    MCHC 31.9 (L) 08/16/2017 09:30 AM    RDW 14.3 08/16/2017 09:30 AM     08/16/2017 09:30 AM    MPV 10.4 08/16/2017 09:30 AM       A1C:  Lab Results   Component Value Date/Time    LABA1C 5.7 (H) 09/13/2021 09:20 AM       Lipid panel:  Lab Results   Component Value Date    CHOL 223 09/13/2021    CHOL 203 01/16/2020    CHOL 237 08/16/2017    TRIG 130 09/13/2021    TRIG 137 01/16/2020    TRIG 90 08/16/2017    HDL 73 09/13/2021    HDL 84 01/16/2020    HDL 87 08/16/2017        Lab Results   Component Value Date/Time    PROT 7.1 09/13/2021 09:20 AM    PROT 6.8 08/16/2017 09:30 AM    PROT 7.5 04/08/2016 08:34 AM       No results found for: MG      Assessment. Cristiane Najera was seen today for other. Diagnoses and all orders for this visit:    Bursitis of left shoulder    Need for influenza vaccination  -     INFLUENZA, QUADV, ADJUVANTED, 65 YRS =, IM, PF, PREFILL SYR, 0.5ML (FLUAD)    Ataxia    Hyperlipidemia, unspecified hyperlipidemia type    Depression, unspecified depression type    Chronic pain of right ankle    Weight loss    Benign essential tremor    Other orders  -     methylPREDNISolone (MEDROL DOSEPACK) 4 MG tablet; Take by mouth. -     pravastatin (PRAVACHOL) 20 MG tablet;  Take 0.5 tablets by mouth daily       Patient Active Problem List   Diagnosis    Ataxia    Benign essential tremor    Depression    Hyperlipidemia       Plan: Bursitis tendinitis left shoulder Medrol Dosepak  She declined physical therapy or to see any orthopedic surgeon    Chronic ataxia seen by neurologist locally in [facility] denies any change in symptoms use a cane to walk fall precaution discussed    Hyperlipidemia cholesterol not to the goal increase pravastatin to 20 mg    Depression controlled continue Wellbutrin     Chronic pain in the right ankle she takes pain medication occasionally      History of weight loss weight loss has improved work-up negative so far  She has gained 16 pounds denies any symptoms monitor      Essential tremors doing better with the propranolol    I will be retiring she will see new physician next visit    Return in about 3 months (around 2/3/2022).        Lexy Bellamy MD  3:05 PM  11/3/2021     [place]

## 2021-11-04 ENCOUNTER — TELEPHONE (OUTPATIENT)
Dept: FAMILY MEDICINE CLINIC | Age: 76
End: 2021-11-04

## 2021-11-04 RX ORDER — PRAVASTATIN SODIUM 20 MG
20 TABLET ORAL DAILY
Qty: 90 TABLET | Refills: 1 | Status: SHIPPED
Start: 2021-11-04 | End: 2022-01-10 | Stop reason: SDUPTHER

## 2021-11-04 NOTE — TELEPHONE ENCOUNTER
Patient wants to know if she is to take 1 tablet daily or 1/2 tablet daily, states you increased strength but kept the dose the same. She states you told her in office to take one 20mg daily. Script reads 1/2 tab. Please advise. Thank you.  Pravachol 20 mg    Last seen 11/3/2021  Next appt 2/3/2022    [facility]

## 2021-11-24 NOTE — TELEPHONE ENCOUNTER
----- Message from Samuel Jean sent at 11/24/2021 11:59 AM EST -----  Subject: Message to Provider    QUESTIONS  Information for Provider? wanted to have her prescription filled for the   packet of pills she takes she wasn't sure what the name of it was  ---------------------------------------------------------------------------  --------------  CALL BACK INFO  What is the best way for the office to contact you? OK to leave message on   voicemail  Preferred Call Back Phone Number? [phone]  ---------------------------------------------------------------------------  --------------  SCRIPT ANSWERS  Relationship to Patient?  Self

## 2021-11-24 NOTE — TELEPHONE ENCOUNTER
I spoke to patient she was in a basket of close she slipped and fell she feels okay denies any head injury go to ER if any new symptoms

## 2021-11-24 NOTE — TELEPHONE ENCOUNTER
Pt says she fell downstairs and hurt her shoulder this past weekend. Pt said she had a medrol dose antonia about 3 weeks ago for a previous shoulder injury and she's wondering if you'd order it for her again.

## 2021-11-26 RX ORDER — METHYLPREDNISOLONE 4 MG/1
TABLET ORAL
Qty: 1 KIT | Refills: 0 | Status: SHIPPED | OUTPATIENT
Start: 2021-11-26 | End: 2021-11-30

## 2021-12-22 ENCOUNTER — TELEPHONE (OUTPATIENT)
Dept: FAMILY MEDICINE CLINIC | Age: 76
End: 2021-12-22

## 2022-01-06 DIAGNOSIS — G89.29 CHRONIC LEFT SHOULDER PAIN: ICD-10-CM

## 2022-01-06 DIAGNOSIS — G89.29 CHRONIC PAIN OF RIGHT ANKLE: ICD-10-CM

## 2022-01-06 DIAGNOSIS — M25.512 CHRONIC LEFT SHOULDER PAIN: ICD-10-CM

## 2022-01-06 DIAGNOSIS — M25.571 CHRONIC PAIN OF RIGHT ANKLE: ICD-10-CM

## 2022-01-10 RX ORDER — IPRATROPIUM BROMIDE 42 UG/1
2 SPRAY, METERED NASAL 2 TIMES DAILY
Qty: 15 ML | Refills: 2 | Status: SHIPPED | OUTPATIENT
Start: 2022-01-10 | End: 2022-02-24 | Stop reason: SDUPTHER

## 2022-01-10 RX ORDER — PRAVASTATIN SODIUM 20 MG
20 TABLET ORAL DAILY
Qty: 90 TABLET | Refills: 1 | Status: SHIPPED | OUTPATIENT
Start: 2022-01-10 | End: 2022-02-24 | Stop reason: ALTCHOICE

## 2022-01-10 RX ORDER — AMLODIPINE BESYLATE 5 MG/1
5 TABLET ORAL DAILY
Qty: 90 TABLET | Refills: 1 | Status: SHIPPED | OUTPATIENT
Start: 2022-01-10 | End: 2022-02-24 | Stop reason: SDUPTHER

## 2022-01-10 RX ORDER — HYDROCODONE BITARTRATE AND ACETAMINOPHEN 5; 325 MG/1; MG/1
1 TABLET ORAL EVERY 8 HOURS PRN
Qty: 30 TABLET | Refills: 0 | Status: SHIPPED | OUTPATIENT
Start: 2022-01-10 | End: 2022-02-09

## 2022-01-10 RX ORDER — BUPROPION HYDROCHLORIDE 300 MG/1
300 TABLET ORAL EVERY MORNING
Qty: 90 TABLET | Refills: 1 | Status: SHIPPED | OUTPATIENT
Start: 2022-01-10 | End: 2022-02-24 | Stop reason: SDUPTHER

## 2022-01-10 RX ORDER — TIZANIDINE 4 MG/1
TABLET ORAL
Qty: 60 TABLET | Refills: 1 | Status: SHIPPED | OUTPATIENT
Start: 2022-01-10 | End: 2022-02-24 | Stop reason: SDUPTHER

## 2022-01-10 RX ORDER — ERGOCALCIFEROL 1.25 MG/1
CAPSULE ORAL
Qty: 6 CAPSULE | Refills: 1 | Status: SHIPPED | OUTPATIENT
Start: 2022-01-10 | End: 2022-02-24 | Stop reason: SDUPTHER

## 2022-01-10 RX ORDER — PROPRANOLOL HCL 60 MG
60 CAPSULE, EXTENDED RELEASE 24HR ORAL DAILY
Qty: 90 CAPSULE | Refills: 1 | Status: SHIPPED | OUTPATIENT
Start: 2022-01-10 | End: 2022-02-24 | Stop reason: SDUPTHER

## 2022-01-10 RX ORDER — OMEPRAZOLE 40 MG/1
40 CAPSULE, DELAYED RELEASE ORAL DAILY
Qty: 90 CAPSULE | Refills: 1 | Status: SHIPPED | OUTPATIENT
Start: 2022-01-10 | End: 2022-02-24 | Stop reason: SDUPTHER

## 2022-01-10 RX ORDER — CETIRIZINE HYDROCHLORIDE 10 MG/1
10 TABLET ORAL DAILY
Qty: 30 TABLET | Refills: 1 | Status: SHIPPED | OUTPATIENT
Start: 2022-01-10 | End: 2022-03-25 | Stop reason: SDUPTHER

## 2022-02-24 ENCOUNTER — OFFICE VISIT (OUTPATIENT)
Dept: FAMILY MEDICINE CLINIC | Age: 77
End: 2022-02-24
Payer: MEDICARE

## 2022-02-24 VITALS
DIASTOLIC BLOOD PRESSURE: 88 MMHG | WEIGHT: 203 LBS | HEART RATE: 76 BPM | SYSTOLIC BLOOD PRESSURE: 128 MMHG | BODY MASS INDEX: 33.78 KG/M2 | OXYGEN SATURATION: 94 % | TEMPERATURE: 96.8 F

## 2022-02-24 DIAGNOSIS — R26.81 UNSTEADY GAIT WHEN WALKING: ICD-10-CM

## 2022-02-24 DIAGNOSIS — E78.00 PURE HYPERCHOLESTEROLEMIA: Primary | ICD-10-CM

## 2022-02-24 DIAGNOSIS — K27.9 PUD (PEPTIC ULCER DISEASE): ICD-10-CM

## 2022-02-24 DIAGNOSIS — I10 ESSENTIAL HYPERTENSION: ICD-10-CM

## 2022-02-24 DIAGNOSIS — F32.1 CURRENT MODERATE EPISODE OF MAJOR DEPRESSIVE DISORDER, UNSPECIFIED WHETHER RECURRENT (HCC): ICD-10-CM

## 2022-02-24 DIAGNOSIS — G25.0 BENIGN ESSENTIAL TREMOR: ICD-10-CM

## 2022-02-24 DIAGNOSIS — J44.9 CHRONIC OBSTRUCTIVE PULMONARY DISEASE, UNSPECIFIED COPD TYPE (HCC): ICD-10-CM

## 2022-02-24 DIAGNOSIS — Z91.81 AT HIGH RISK FOR FALLS: ICD-10-CM

## 2022-02-24 DIAGNOSIS — E55.9 HYPOVITAMINOSIS D: ICD-10-CM

## 2022-02-24 PROCEDURE — 3017F COLORECTAL CA SCREEN DOC REV: CPT | Performed by: FAMILY MEDICINE

## 2022-02-24 PROCEDURE — 99214 OFFICE O/P EST MOD 30 MIN: CPT | Performed by: FAMILY MEDICINE

## 2022-02-24 PROCEDURE — G8484 FLU IMMUNIZE NO ADMIN: HCPCS | Performed by: FAMILY MEDICINE

## 2022-02-24 PROCEDURE — G8417 CALC BMI ABV UP PARAM F/U: HCPCS | Performed by: FAMILY MEDICINE

## 2022-02-24 PROCEDURE — G8427 DOCREV CUR MEDS BY ELIG CLIN: HCPCS | Performed by: FAMILY MEDICINE

## 2022-02-24 PROCEDURE — G8400 PT W/DXA NO RESULTS DOC: HCPCS | Performed by: FAMILY MEDICINE

## 2022-02-24 PROCEDURE — 4040F PNEUMOC VAC/ADMIN/RCVD: CPT | Performed by: FAMILY MEDICINE

## 2022-02-24 PROCEDURE — 3023F SPIROM DOC REV: CPT | Performed by: FAMILY MEDICINE

## 2022-02-24 PROCEDURE — 1036F TOBACCO NON-USER: CPT | Performed by: FAMILY MEDICINE

## 2022-02-24 PROCEDURE — 1090F PRES/ABSN URINE INCON ASSESS: CPT | Performed by: FAMILY MEDICINE

## 2022-02-24 PROCEDURE — 1123F ACP DISCUSS/DSCN MKR DOCD: CPT | Performed by: FAMILY MEDICINE

## 2022-02-24 RX ORDER — PROPRANOLOL HCL 60 MG
60 CAPSULE, EXTENDED RELEASE 24HR ORAL DAILY
Qty: 90 CAPSULE | Refills: 1 | Status: SHIPPED
Start: 2022-02-24 | End: 2022-07-18 | Stop reason: SDUPTHER

## 2022-02-24 RX ORDER — IPRATROPIUM BROMIDE 42 UG/1
2 SPRAY, METERED NASAL 2 TIMES DAILY
Qty: 15 ML | Refills: 2 | Status: SHIPPED
Start: 2022-02-24 | End: 2022-06-28

## 2022-02-24 RX ORDER — AMLODIPINE BESYLATE 5 MG/1
5 TABLET ORAL DAILY
Qty: 90 TABLET | Refills: 1 | Status: SHIPPED
Start: 2022-02-24 | End: 2022-07-18 | Stop reason: SDUPTHER

## 2022-02-24 RX ORDER — PRAVASTATIN SODIUM 20 MG
20 TABLET ORAL DAILY
Qty: 90 TABLET | Refills: 1 | Status: CANCELLED | OUTPATIENT
Start: 2022-02-24

## 2022-02-24 RX ORDER — OMEPRAZOLE 40 MG/1
40 CAPSULE, DELAYED RELEASE ORAL DAILY
Qty: 90 CAPSULE | Refills: 1 | Status: SHIPPED
Start: 2022-02-24 | End: 2022-07-18 | Stop reason: SDUPTHER

## 2022-02-24 RX ORDER — BUPROPION HYDROCHLORIDE 300 MG/1
300 TABLET ORAL EVERY MORNING
Qty: 90 TABLET | Refills: 1 | Status: SHIPPED
Start: 2022-02-24 | End: 2022-07-18 | Stop reason: SDUPTHER

## 2022-02-24 RX ORDER — ERGOCALCIFEROL 1.25 MG/1
CAPSULE ORAL
Qty: 6 CAPSULE | Refills: 1 | Status: SHIPPED
Start: 2022-02-24 | End: 2022-02-24 | Stop reason: DRUGHIGH

## 2022-02-24 RX ORDER — TIZANIDINE 4 MG/1
TABLET ORAL
Qty: 60 TABLET | Refills: 1 | Status: SHIPPED
Start: 2022-02-24 | End: 2022-11-01 | Stop reason: SDUPTHER

## 2022-02-24 RX ORDER — ROSUVASTATIN CALCIUM 10 MG/1
10 TABLET, COATED ORAL DAILY
Qty: 90 TABLET | Refills: 1 | Status: SHIPPED
Start: 2022-02-24 | End: 2022-07-18 | Stop reason: SDUPTHER

## 2022-02-24 SDOH — ECONOMIC STABILITY: FOOD INSECURITY: WITHIN THE PAST 12 MONTHS, YOU WORRIED THAT YOUR FOOD WOULD RUN OUT BEFORE YOU GOT MONEY TO BUY MORE.: NEVER TRUE

## 2022-02-24 SDOH — ECONOMIC STABILITY: FOOD INSECURITY: WITHIN THE PAST 12 MONTHS, THE FOOD YOU BOUGHT JUST DIDN'T LAST AND YOU DIDN'T HAVE MONEY TO GET MORE.: NEVER TRUE

## 2022-02-24 ASSESSMENT — PATIENT HEALTH QUESTIONNAIRE - PHQ9
SUM OF ALL RESPONSES TO PHQ QUESTIONS 1-9: 0
SUM OF ALL RESPONSES TO PHQ QUESTIONS 1-9: 0
SUM OF ALL RESPONSES TO PHQ9 QUESTIONS 1 & 2: 0
7. TROUBLE CONCENTRATING ON THINGS, SUCH AS READING THE NEWSPAPER OR WATCHING TELEVISION: 0
SUM OF ALL RESPONSES TO PHQ QUESTIONS 1-9: 0
9. THOUGHTS THAT YOU WOULD BE BETTER OFF DEAD, OR OF HURTING YOURSELF: 0
4. FEELING TIRED OR HAVING LITTLE ENERGY: 0
SUM OF ALL RESPONSES TO PHQ QUESTIONS 1-9: 0
5. POOR APPETITE OR OVEREATING: 0
6. FEELING BAD ABOUT YOURSELF - OR THAT YOU ARE A FAILURE OR HAVE LET YOURSELF OR YOUR FAMILY DOWN: 0
8. MOVING OR SPEAKING SO SLOWLY THAT OTHER PEOPLE COULD HAVE NOTICED. OR THE OPPOSITE, BEING SO FIGETY OR RESTLESS THAT YOU HAVE BEEN MOVING AROUND A LOT MORE THAN USUAL: 0
2. FEELING DOWN, DEPRESSED OR HOPELESS: 0
10. IF YOU CHECKED OFF ANY PROBLEMS, HOW DIFFICULT HAVE THESE PROBLEMS MADE IT FOR YOU TO DO YOUR WORK, TAKE CARE OF THINGS AT HOME, OR GET ALONG WITH OTHER PEOPLE: 0
1. LITTLE INTEREST OR PLEASURE IN DOING THINGS: 0
3. TROUBLE FALLING OR STAYING ASLEEP: 0

## 2022-02-24 ASSESSMENT — SOCIAL DETERMINANTS OF HEALTH (SDOH): HOW HARD IS IT FOR YOU TO PAY FOR THE VERY BASICS LIKE FOOD, HOUSING, MEDICAL CARE, AND HEATING?: NOT HARD AT ALL

## 2022-02-24 NOTE — PROGRESS NOTES
OFFICE PROGRESS NOTE      SUBJECTIVE:        Patient ID:   Marilu Iqbal is a 78 y.o. female who presents for   Chief Complaint   Patient presents with    New Patient    Knee Pain     left would like medrol dose pack    Health Maintenance     Due for awv, refuses colonosopy           HPI:     WAS SEEING DR. Hal Zamora. RECHECK BP, CHOLESTEROL AND VITAMIN D.  HAS ARTHRITIS IN MULTIPLE JOINTS. WILL TRY EXERCISE AND LOCAL CREAM FOR RELIEF AS SUGGESTED. ALSO WILL CONTINUE USING WALKING STICK FOR SUPPORT. MOOD WELL CONTROLLED WITH [facility]. MEDICATION REFILL. FEELS GOOD. WATCHING DIET GOOD. WALKING FOR EXERCISE. TAKING MEDICATIONS REGULARLY. Prior to Admission medications    Medication Sig Start Date End Date Taking?  Authorizing Provider   amLODIPine (NORVASC) 5 MG tablet Take 1 tablet by mouth daily 2/24/22  Yes Matt Cleary MD   buPROPion (WELLBUTRIN XL) 300 MG extended release tablet Take 1 tablet by mouth every morning 2/24/22  Yes Matt Cleary MD   ipratropium (ATROVENT) 0.06 % nasal spray 2 sprays by Each Nostril route 2 times daily 2/24/22  Yes Matt Cleary MD   omeprazole (PRILOSEC) 40 MG delayed release capsule Take 1 capsule by mouth daily 2/24/22  Yes Matt Cleary MD   propranolol (INDERAL LA) 60 MG extended release capsule Take 1 capsule by mouth daily 2/24/22  Yes Matt Cleary MD   tiZANidine (ZANAFLEX) 4 MG tablet TAKE 1 TABLET BY MOUTH TWICE DAILY 2/24/22  Yes Matt Cleary MD   rosuvastatin (CRESTOR) 10 MG tablet Take 1 tablet by mouth daily 2/24/22  Yes Matt Cleary MD   Cholecalciferol (VITAMIN D3) 1.25 MG (72108 UT) CAPS Take 1 capsule by mouth once a week   Yes Historical Provider, MD   Handicap Placard [facility] [place] 2/11/2026 2/11/21   Malou Eric MD     Social History     Socioeconomic History    Marital status: Single     Spouse name: Not on file    Number of children: Not on file    Years of education: Not on file    Highest education level: Not on file   Occupational History    Not on file   Tobacco Use    Smoking status: Never Smoker    Smokeless tobacco: Never Used   Substance and Sexual Activity    Alcohol use: Never    Drug use: Never    Sexual activity: Not on file   Other Topics Concern    Not on file   Social History Narrative    Not on file     Social Determinants of Health     Financial Resource Strain: Low Risk     Difficulty of Paying Living Expenses: Not hard at all   Food Insecurity: No Food Insecurity    Worried About Running Out of Food in the Last Year: Never true    [address] in the Last Year: Never true   Transportation Needs:     Lack of Transportation (Medical): Not on file    Lack of Transportation (Non-Medical):  Not on file   Physical Activity:     Days of Exercise per Week: Not on file    Minutes of Exercise per Session: Not on file   Stress:     Feeling of Stress : Not on file   Social Connections:     Frequency of Communication with Friends and Family: Not on file    Frequency of Social Gatherings with Friends and Family: Not on file    Attends Bahai Services: Not on file    Active Member of [address] [place] [address] or Organizations: Not on file    Attends Club or Organization Meetings: Not on file    Marital Status: Not on file   Intimate Partner Violence:     Fear of Current or Ex-Partner: Not on file    Emotionally Abused: Not on file    Physically Abused: Not on file    Sexually Abused: Not on file   Housing Stability:     Unable to Pay for Housing in the Last Year: Not on file    Number of [place] in the Last Year: Not on file    Unstable Housing in the Last Year: Not on file       I have reviewed Becki's allergies, medications, problem list, medical, social and family history and have updated as needed in the electronic medical record  Review Of Systems:    Skin: no abnormal pigmentation, rash, scaling, itching, masses, hair or nail changes  Eyes: no blurring, diplopia, or eye pain  Ears/Nose/Throat: no hearing loss, tinnitus, vertigo, nosebleed, nasal congestion, rhinorrhea, sore throat  Respiratory: no cough, pleuritic chest pain, dyspnea, or wheezing  Cardiovascular: no chest pain, angina, dyspnea on exertion, orthopnea, PND, palpitations, or claudication  Gastrointestinal: no nausea, vomiting, heartburn, diarrhea, constipation, bloating,  abdominal pain, or blood per rectum. Appetite is good  Genitourinary: no urinary urgency, frequency, dysuria, nocturia, hesitancy, or incontinence  Musculoskeletal: joint pains off and on. Morning stiffness. Ambulating well  Neurologic: no paralysis, paresis, paresthesia, seizures, tremors, or headaches  Hematologic/Lymphatic/Immunologic: no anemia, abnormal bleeding/bruising, fever, chills, night sweats, swollen glands, or unexplained weight loss  Endocrine: no heat or cold intolerance and no polyphagia, polydipsia, or polyuria        OBJECTIVE:     VS:  Wt Readings from Last 3 Encounters:   02/24/22 203 lb (92.1 kg)   11/03/21 207 lb (93.9 kg)   09/21/21 191 lb (86.6 kg)     Temp Readings from Last 3 Encounters:   02/24/22 96.8 °F (36 °C)   09/21/21 95.7 °F (35.4 °C)   02/08/21 98 °F (36.7 °C)     BP Readings from Last 3 Encounters:   02/24/22 128/88   11/03/21 136/82   09/21/21 130/82        General appearance: Alert, Awake, Oriented times 3, no distress  Skin: Warm and dry  Head: Normocephalic. No masses, lesions or tenderness noted  Eyes: Conjunctivae appear normal. PERLE  Ears: External ears normal  Nose/Sinuses: Nares normal. Septum midline. Mucosa normal. No drainage  Oropharynx: Oropharynx clear with no exudate seen  Neck: Neck supple. No jugular venous distension, lymphadenopathy or thyromegaly Trachea midline  Chest:  Normal. Movements are Normal and Equal.  Lungs: Lungs clear to auscultation bilaterally. No ronchi, crackles or wheezes  Heart: S1 S2  Regular rate and rhythm.  No rub, murmur or gallop  Abdomen: Abdomen soft, non-tender. BS normal. No masses, organomegaly. Back: Grossly Normal and Equal. DTR are Normal. SLR is Normal.  Extremities: Arthritic changes are noted. Movements are limited. Pedal pulses are normal.  Musculoskeletal: Muscular strength appears intact. No joint effusion, tenderness, swelling or warmth  Neuro:  No focal motor or sensory deficits        ASSESSMENT     Patient Active Problem List    Diagnosis Date Noted    Pure hypercholesterolemia 06/14/2021    Hypovitaminosis D 02/24/2022    Chronic obstructive pulmonary disease ([facility] 02/24/2022    PUD (peptic ulcer disease) 02/24/2022    Current moderate episode of major depressive disorder ([facility] 02/24/2022    Unsteady gait when walking 02/24/2022    Essential hypertension 02/24/2022    Ataxia 06/14/2021    Benign essential tremor 06/14/2021    Depression 06/14/2021        Diagnosis:     ICD-10-CM    1. Pure hypercholesterolemia  E78.00 Comprehensive Metabolic Panel     Lipid Panel   2. Benign essential tremor  G25.0    3. Hypovitaminosis D  E55.9 Vitamin D 25 Hydroxy   4. Chronic obstructive pulmonary disease, unspecified COPD type ([facility]  J44.9 CBC with Auto Differential   5. At high risk for falls  Z91.81    6. PUD (peptic ulcer disease)  K27.9 CBC with Auto Differential   7. Current moderate episode of major depressive disorder, unspecified whether recurrent ([facility]  F32.1    8. Unsteady gait when walking  R26.81    9. Essential hypertension  I10        PLAN:           Patient Instructions   LOW SALT FOR BLOOD PRESSURE CONTROL. LOW CARBOHYDRATE FOR  WEIGHT CONTROL. LOW FAT DIET FOR CHOLESTEROL CONTROL. DRINK ENOUGH FLUIDS FOR BETTER KIDNEY FUNCTION. TAKE AMLODIPINE 5 MG. AND INDERAL LA 60 MG. FOR BLOOD PRESSURE CONTROL. TAKE CRESTOR 10 MG. NIGHTLY  FOR CHOLESTEROL CONTROL. Chasshirley Preciado STOP TAKING PRAVACHOL. TAKE  VITAMIN D-3 15123 UNITS WEEKLYFOR IMPROVING DEFICIENCY. TAKE WELLBUTRIN  MG. DAILY  FOR MOOD CONTROL.   USE ATROVENT INHALER 2 PUFFS 2 TIMES A DAY.  REGULAR WALKING ADVISED. CONTINUE USING WALKING STICK FOR SUPPORT. ADVISED WEIGHT REDUCTION. FASTING FOR LAB WORK ONE MORNING. NEXT APPOINTMENT IN 1 MONTH FOR ANNUAL PHYSICAL EXAMINATION . On the basis of positive falls risk screening, assessment and plan is as follows: home safety tips provided, ADVISED CONTINUE [address] Cheikh Faria Return in about 1 month (around 3/24/2022) for [employer] .         I have reviewed my findings and recommendations with Jasmyn Ceron.     Electronically signed by Damián Meyer MD on 2/24/22 at 9:06 AM EST

## 2022-02-24 NOTE — PATIENT INSTRUCTIONS
LOW SALT FOR BLOOD PRESSURE CONTROL. LOW CARBOHYDRATE FOR  WEIGHT CONTROL. LOW FAT DIET FOR CHOLESTEROL CONTROL. DRINK ENOUGH FLUIDS FOR BETTER KIDNEY FUNCTION. TAKE AMLODIPINE 5 MG. AND INDERAL LA 60 MG. FOR BLOOD PRESSURE CONTROL. TAKE CRESTOR 10 MG. NIGHTLY  FOR CHOLESTEROL CONTROL. Jasper Marie STOP TAKING PRAVACHOL. TAKE  VITAMIN D-3 67628 UNITS WEEKLYFOR IMPROVING DEFICIENCY. TAKE WELLBUTRIN  MG. DAILY  FOR MOOD CONTROL. USE ATROVENT INHALER 2 PUFFS 2 TIMES A DAY. REGULAR WALKING ADVISED. CONTINUE USING WALKING STICK FOR SUPPORT. ADVISED WEIGHT REDUCTION. FASTING FOR LAB WORK ONE MORNING. NEXT APPOINTMENT IN 1 MONTH FOR ANNUAL PHYSICAL EXAMINATION .

## 2022-03-22 DIAGNOSIS — J44.9 CHRONIC OBSTRUCTIVE PULMONARY DISEASE, UNSPECIFIED COPD TYPE (HCC): ICD-10-CM

## 2022-03-22 DIAGNOSIS — E55.9 HYPOVITAMINOSIS D: ICD-10-CM

## 2022-03-22 DIAGNOSIS — E78.00 PURE HYPERCHOLESTEROLEMIA: ICD-10-CM

## 2022-03-22 DIAGNOSIS — K27.9 PUD (PEPTIC ULCER DISEASE): ICD-10-CM

## 2022-03-22 LAB
ALBUMIN SERPL-MCNC: 4 G/DL (ref 3.5–5.2)
ALP BLD-CCNC: 92 U/L (ref 35–104)
ALT SERPL-CCNC: 18 U/L (ref 0–32)
ANION GAP SERPL CALCULATED.3IONS-SCNC: 11 MMOL/L (ref 7–16)
AST SERPL-CCNC: 21 U/L (ref 0–31)
BASOPHILS ABSOLUTE: 0.06 E9/L (ref 0–0.2)
BASOPHILS RELATIVE PERCENT: 0.8 % (ref 0–2)
BILIRUB SERPL-MCNC: 0.3 MG/DL (ref 0–1.2)
BUN BLDV-MCNC: 19 MG/DL (ref 6–23)
CALCIUM SERPL-MCNC: 9.7 MG/DL (ref 8.6–10.2)
CHLORIDE BLD-SCNC: 99 MMOL/L (ref 98–107)
CHOLESTEROL, TOTAL: 186 MG/DL (ref 0–199)
CO2: 27 MMOL/L (ref 22–29)
CREAT SERPL-MCNC: 0.9 MG/DL (ref 0.5–1)
EOSINOPHILS ABSOLUTE: 0.23 E9/L (ref 0.05–0.5)
EOSINOPHILS RELATIVE PERCENT: 3.2 % (ref 0–6)
GFR AFRICAN AMERICAN: >60
GFR NON-AFRICAN AMERICAN: >60 ML/MIN/1.73
GLUCOSE BLD-MCNC: 111 MG/DL (ref 74–99)
HCT VFR BLD CALC: 46.3 % (ref 34–48)
HDLC SERPL-MCNC: 75 MG/DL
HEMOGLOBIN: 14.8 G/DL (ref 11.5–15.5)
IMMATURE GRANULOCYTES #: 0.04 E9/L
IMMATURE GRANULOCYTES %: 0.6 % (ref 0–5)
LDL CHOLESTEROL CALCULATED: 88 MG/DL (ref 0–99)
LYMPHOCYTES ABSOLUTE: 2.75 E9/L (ref 1.5–4)
LYMPHOCYTES RELATIVE PERCENT: 38.5 % (ref 20–42)
MCH RBC QN AUTO: 30.1 PG (ref 26–35)
MCHC RBC AUTO-ENTMCNC: 32 % (ref 32–34.5)
MCV RBC AUTO: 94.3 FL (ref 80–99.9)
MONOCYTES ABSOLUTE: 0.66 E9/L (ref 0.1–0.95)
MONOCYTES RELATIVE PERCENT: 9.2 % (ref 2–12)
NEUTROPHILS ABSOLUTE: 3.4 E9/L (ref 1.8–7.3)
NEUTROPHILS RELATIVE PERCENT: 47.7 % (ref 43–80)
PDW BLD-RTO: 13.6 FL (ref 11.5–15)
PLATELET # BLD: 308 E9/L (ref 130–450)
PMV BLD AUTO: 10.3 FL (ref 7–12)
POTASSIUM SERPL-SCNC: 4.5 MMOL/L (ref 3.5–5)
RBC # BLD: 4.91 E12/L (ref 3.5–5.5)
SODIUM BLD-SCNC: 137 MMOL/L (ref 132–146)
TOTAL PROTEIN: 6.7 G/DL (ref 6.4–8.3)
TRIGL SERPL-MCNC: 116 MG/DL (ref 0–149)
VITAMIN D 25-HYDROXY: 36 NG/ML (ref 30–100)
VLDLC SERPL CALC-MCNC: 23 MG/DL
WBC # BLD: 7.1 E9/L (ref 4.5–11.5)

## 2022-03-25 NOTE — TELEPHONE ENCOUNTER
Patient called for refill. Patient is asking for a 90 day refill.     Last seen 2/24/2022  Next appt 3/31/2022  [facility]

## 2022-03-28 RX ORDER — CETIRIZINE HYDROCHLORIDE 10 MG/1
10 TABLET ORAL DAILY
Qty: 90 TABLET | Refills: 0 | Status: SHIPPED | OUTPATIENT
Start: 2022-03-28 | End: 2022-04-27

## 2022-04-12 ENCOUNTER — OFFICE VISIT (OUTPATIENT)
Dept: FAMILY MEDICINE CLINIC | Age: 77
End: 2022-04-12
Payer: MEDICARE

## 2022-04-12 VITALS
WEIGHT: 203 LBS | RESPIRATION RATE: 16 BRPM | SYSTOLIC BLOOD PRESSURE: 132 MMHG | HEART RATE: 71 BPM | BODY MASS INDEX: 33.82 KG/M2 | TEMPERATURE: 97.4 F | HEIGHT: 65 IN | OXYGEN SATURATION: 95 % | DIASTOLIC BLOOD PRESSURE: 82 MMHG

## 2022-04-12 DIAGNOSIS — R27.0 ATAXIA: ICD-10-CM

## 2022-04-12 DIAGNOSIS — E78.2 MIXED HYPERLIPIDEMIA: ICD-10-CM

## 2022-04-12 DIAGNOSIS — J30.2 SEASONAL ALLERGIES: ICD-10-CM

## 2022-04-12 DIAGNOSIS — K21.9 GASTROESOPHAGEAL REFLUX DISEASE, UNSPECIFIED WHETHER ESOPHAGITIS PRESENT: ICD-10-CM

## 2022-04-12 DIAGNOSIS — F32.1 CURRENT MODERATE EPISODE OF MAJOR DEPRESSIVE DISORDER, UNSPECIFIED WHETHER RECURRENT (HCC): ICD-10-CM

## 2022-04-12 DIAGNOSIS — E55.9 VITAMIN D DEFICIENCY: ICD-10-CM

## 2022-04-12 DIAGNOSIS — I10 ESSENTIAL HYPERTENSION: ICD-10-CM

## 2022-04-12 DIAGNOSIS — Z98.890 HISTORY OF BRAIN SURGERY: ICD-10-CM

## 2022-04-12 DIAGNOSIS — R73.03 PREDIABETES: Primary | ICD-10-CM

## 2022-04-12 DIAGNOSIS — F41.9 ANXIETY: ICD-10-CM

## 2022-04-12 LAB — HBA1C MFR BLD: 6.1 %

## 2022-04-12 PROCEDURE — 1036F TOBACCO NON-USER: CPT | Performed by: FAMILY MEDICINE

## 2022-04-12 PROCEDURE — 4040F PNEUMOC VAC/ADMIN/RCVD: CPT | Performed by: FAMILY MEDICINE

## 2022-04-12 PROCEDURE — 3017F COLORECTAL CA SCREEN DOC REV: CPT | Performed by: FAMILY MEDICINE

## 2022-04-12 PROCEDURE — G8417 CALC BMI ABV UP PARAM F/U: HCPCS | Performed by: FAMILY MEDICINE

## 2022-04-12 PROCEDURE — G8400 PT W/DXA NO RESULTS DOC: HCPCS | Performed by: FAMILY MEDICINE

## 2022-04-12 PROCEDURE — 99214 OFFICE O/P EST MOD 30 MIN: CPT | Performed by: FAMILY MEDICINE

## 2022-04-12 PROCEDURE — 1123F ACP DISCUSS/DSCN MKR DOCD: CPT | Performed by: FAMILY MEDICINE

## 2022-04-12 PROCEDURE — 1090F PRES/ABSN URINE INCON ASSESS: CPT | Performed by: FAMILY MEDICINE

## 2022-04-12 PROCEDURE — G8427 DOCREV CUR MEDS BY ELIG CLIN: HCPCS | Performed by: FAMILY MEDICINE

## 2022-04-12 PROCEDURE — 83036 HEMOGLOBIN GLYCOSYLATED A1C: CPT | Performed by: FAMILY MEDICINE

## 2022-04-12 RX ORDER — CHOLECALCIFEROL (VITAMIN D3) 1250 MCG
CAPSULE ORAL
Qty: 6 CAPSULE | Refills: 0
Start: 2022-04-12

## 2022-04-12 NOTE — PROGRESS NOTES
[facility]        SUBJECTIVE:  CC: had concerns including New Patient (Pt here to become established . Previous provider Dr. Loreta Bauer.). Chino Ng presented to the clinic to establish with a new provider. 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 She ambulates with a cane. She denies any falls. She states she has been to several specialist and had several test for the ataxia, but no one can do anything. She denies any further work up/consult. She feels her symptoms are stable. Review of Systems   Constitutional: Negative for appetite change, fatigue and fever. Respiratory: Negative for cough, shortness of breath and wheezing. Cardiovascular: Negative for chest pain and palpitations. Gastrointestinal: Negative for abdominal pain, constipation, diarrhea, nausea and vomiting. Gerd   Allergic/Immunologic: Positive for environmental allergies. Negative for food allergies. Neurological: Negative for seizures and syncope. Chronic ataxia   Psychiatric/Behavioral: Negative for suicidal ideas. The patient is nervous/anxious.          Depression       Outpatient Medications Marked as Taking for the 4/12/22 encounter (Office Visit) with Jacky Stewart MD   Medication Sig Dispense Refill    Cholecalciferol (VITAMIN D3) 1.25 MG (44835 UT) CAPS Take 1 tablet q2 weeks 6 capsule 0    cetirizine (ZYRTEC) 10 MG tablet Take 1 tablet by mouth daily 90 tablet 0    amLODIPine (NORVASC) 5 MG tablet Take 1 tablet by mouth daily 90 tablet 1    buPROPion (WELLBUTRIN XL) 300 MG extended release tablet Take 1 tablet by mouth every morning 90 tablet 1    ipratropium (ATROVENT) 0.06 % nasal spray 2 sprays by Each Nostril route 2 times daily 15 mL 2    omeprazole (PRILOSEC) 40 MG delayed release capsule Take 1 capsule by mouth daily 90 capsule 1    propranolol (INDERAL LA) 60 MG extended release capsule Take 1 capsule by mouth daily 90 capsule 1    tiZANidine (ZANAFLEX) 4 MG tablet TAKE 1 TABLET BY MOUTH TWICE DAILY 60 tablet 1    rosuvastatin (CRESTOR) 10 MG tablet Take 1 tablet by mouth daily 90 tablet 1    Handicap Placard MISC UNTIL 2/11/2026 1 each 0       Past Medical History:   Diagnosis Date    Angioma     brain    Anxiety     Ataxia     Cerebrovascular accident (CVA) ([address]     Per patient at age 6-9   [place] Cerebrovascular disease     Chronic back pain     GERD (gastroesophageal reflux disease)     Headache     Hyperlipidemia        Past Surgical History:   Procedure Laterality Date    BRAIN SURGERY  07/2010    Cerebellar Cavernous Angioma resection     BREAST CYST ASPIRATION      BREAST REDUCTION SURGERY Bilateral 1993    HAND SURGERY Left     trapezius removal     HYSTERECTOMY, TOTAL ABDOMINAL  1979    SALIVARY GLAND SURGERY Left 1992    TONSILLECTOMY         Family History   Problem Relation Age of Onset    Heart Attack Father 61       No Known Allergies    Social History:  TOBACCO:   reports that she has never smoked. She has never used smokeless tobacco.  ETOH:   reports no history of alcohol use. OBJECTIVE    VS: /82   Pulse 71   Temp 97.4 °F (36.3 °C)   Resp 16   Ht 5' 5\" (1.651 m)   Wt 203 lb (92.1 kg)   SpO2 95%   Breastfeeding No   BMI 33.78 kg/m²   Physical Exam  Constitutional:       General: She is not in acute distress. Appearance: She is well-developed. She is not diaphoretic. Comments: Ambulates with cane   HENT:      Head: Normocephalic and atraumatic. Eyes:      Conjunctiva/sclera: Conjunctivae normal.      Pupils: Pupils are equal, round, and reactive to light. Cardiovascular:      Rate and Rhythm: Normal rate and regular rhythm. Pulmonary:      Effort: Pulmonary effort is normal.      Breath sounds: Normal breath sounds. Abdominal:      General: Bowel sounds are normal. There is no distension. Palpations: Abdomen is soft. Tenderness:  There is no abdominal tenderness. Hernia: No hernia is present. Musculoskeletal:      Cervical back: Normal range of motion and neck supple. Skin:     General: Skin is warm and dry. Neurological:      Mental Status: She is alert and oriented to person, place, and time. Comments: Positive rhomberg           ASSESSMENT/PLAN:  1. Prediabetes  6.1%. Continue to monitor.   - POCT glycosylated hemoglobin (Hb A1C)    2. Vitamin D deficiency  - Cholecalciferol (VITAMIN D3) 1.25 MG (47689 UT) CAPS; Take 1 tablet q2 weeks  Dispense: 6 capsule; Refill: 0    3. Current moderate episode of major depressive disorder, unspecified whether recurrent ([facility] [address] [facility]    4. Anxiety  #3-4 stable. Continue current regimen. Declines CBT. 5. Mixed hyperlipidemia    6. Essential hypertension  Stable. Continue current regimen. 7. Gastroesophageal reflux disease, unspecified whether esophagitis present  GERD diet. Wean as tolerated. 8. Seasonal allergies  Stable. Continue current regimen. 9. Ataxia  Since brain surgery. Has been to several specialist in the past with imaging done. Declines any further/repeat work up including imaging, labs, or referrals. Continue to ambulate with cane. 10. History of brain surgery      I have reviewed my findings and recommendations with Marcella Patterson MD  4/18/2022 12:19 PM    Counseled regarding above diagnosis, including possible risks and complications, especially if left uncontrolled. Discussed medications risk/benefits and possible side effects and alternatives to treatment. Patient and/or guardian verbalizes understanding, agrees, feels comfortable with and wishes to proceed with above treatment plan.       Advised patient regarding importance of keeping up with recommended health maintenance and to schedule as soon as possible if overdue, as this is important in assessing for undiagnosed pathology, especially cancer, as well as protecting against potentially harmful/life threatening disease. Patient and/or guardian verbalizes understanding and agrees with above counseling, assessment and plan. All questions answered. Please note this report has been partially produced using speech recognition software  and may contain errors related to that system including grammar, punctuation and spelling as well as words and phrases that may seem inappropriate. If there are questions or concerns please feel free to contact me to clarify.

## 2022-04-18 ASSESSMENT — ENCOUNTER SYMPTOMS
CONSTIPATION: 0
ABDOMINAL PAIN: 0
NAUSEA: 0
SHORTNESS OF BREATH: 0
WHEEZING: 0
COUGH: 0
DIARRHEA: 0
VOMITING: 0

## 2022-04-20 ENCOUNTER — TELEPHONE (OUTPATIENT)
Dept: FAMILY MEDICINE CLINIC | Age: 77
End: 2022-04-20

## 2022-04-20 NOTE — TELEPHONE ENCOUNTER
----- Message from Marsha Nazario sent at 4/20/2022 11:39 AM EDT -----  Subject: Message to Provider    QUESTIONS  Information for Provider? Pt needs a call back to discuss her blood test   numbers from labs.   ---------------------------------------------------------------------------  --------------  CALL BACK INFO  What is the best way for the office to contact you? OK to leave message on   voicemail  Preferred Call Back Phone Number? [phone]  ---------------------------------------------------------------------------  --------------  SCRIPT ANSWERS  Relationship to Patient?  Self

## 2022-04-20 NOTE — TELEPHONE ENCOUNTER
This MA spoke with pt. Pt advised lab work looked good per Dr. Lestine Cushing, and if she had specific questions they can be addressed at her follow up visit. Pt verbalized she understood and thanked MA for the return call.     Electronically signed by Randall Wilson MA on 4/20/22 at 3:36 PM EDT

## 2022-04-25 RX ORDER — IPRATROPIUM BROMIDE 42 UG/1
SPRAY, METERED NASAL
Qty: 15 ML | Refills: 2 | OUTPATIENT
Start: 2022-04-25

## 2022-04-29 RX ORDER — TIZANIDINE 4 MG/1
TABLET ORAL
Qty: 60 TABLET | Refills: 1 | OUTPATIENT
Start: 2022-04-29

## 2022-06-28 ENCOUNTER — OFFICE VISIT (OUTPATIENT)
Dept: FAMILY MEDICINE CLINIC | Age: 77
End: 2022-06-28
Payer: MEDICARE

## 2022-06-28 VITALS
BODY MASS INDEX: 33.15 KG/M2 | DIASTOLIC BLOOD PRESSURE: 70 MMHG | WEIGHT: 199 LBS | HEART RATE: 69 BPM | RESPIRATION RATE: 18 BRPM | SYSTOLIC BLOOD PRESSURE: 112 MMHG | OXYGEN SATURATION: 94 % | HEIGHT: 65 IN | TEMPERATURE: 97.4 F

## 2022-06-28 DIAGNOSIS — J84.10 PULMONARY FIBROSIS (HCC): ICD-10-CM

## 2022-06-28 DIAGNOSIS — Z00.00 INITIAL MEDICARE ANNUAL WELLNESS VISIT: Primary | ICD-10-CM

## 2022-06-28 DIAGNOSIS — G89.29 CHRONIC KNEE PAIN, UNSPECIFIED LATERALITY: ICD-10-CM

## 2022-06-28 DIAGNOSIS — Z12.11 COLON CANCER SCREENING: ICD-10-CM

## 2022-06-28 DIAGNOSIS — K21.9 GASTROESOPHAGEAL REFLUX DISEASE WITHOUT ESOPHAGITIS: ICD-10-CM

## 2022-06-28 DIAGNOSIS — Z23 NEED FOR PNEUMOCOCCAL VACCINE: ICD-10-CM

## 2022-06-28 DIAGNOSIS — M25.569 CHRONIC KNEE PAIN, UNSPECIFIED LATERALITY: ICD-10-CM

## 2022-06-28 DIAGNOSIS — Z78.0 POST-MENOPAUSAL: ICD-10-CM

## 2022-06-28 PROCEDURE — 90732 PPSV23 VACC 2 YRS+ SUBQ/IM: CPT | Performed by: FAMILY MEDICINE

## 2022-06-28 PROCEDURE — G0438 PPPS, INITIAL VISIT: HCPCS | Performed by: FAMILY MEDICINE

## 2022-06-28 PROCEDURE — 3017F COLORECTAL CA SCREEN DOC REV: CPT | Performed by: FAMILY MEDICINE

## 2022-06-28 PROCEDURE — 99214 OFFICE O/P EST MOD 30 MIN: CPT | Performed by: FAMILY MEDICINE

## 2022-06-28 PROCEDURE — G0009 ADMIN PNEUMOCOCCAL VACCINE: HCPCS | Performed by: FAMILY MEDICINE

## 2022-06-28 PROCEDURE — 1123F ACP DISCUSS/DSCN MKR DOCD: CPT | Performed by: FAMILY MEDICINE

## 2022-06-28 RX ORDER — FAMOTIDINE 20 MG/1
20 TABLET, FILM COATED ORAL 2 TIMES DAILY
Qty: 60 TABLET | Refills: 1 | Status: SHIPPED
Start: 2022-06-28 | End: 2022-07-18 | Stop reason: SDUPTHER

## 2022-06-28 ASSESSMENT — PATIENT HEALTH QUESTIONNAIRE - PHQ9
10. IF YOU CHECKED OFF ANY PROBLEMS, HOW DIFFICULT HAVE THESE PROBLEMS MADE IT FOR YOU TO DO YOUR WORK, TAKE CARE OF THINGS AT HOME, OR GET ALONG WITH OTHER PEOPLE: 0
1. LITTLE INTEREST OR PLEASURE IN DOING THINGS: 1
SUM OF ALL RESPONSES TO PHQ9 QUESTIONS 1 & 2: 2
SUM OF ALL RESPONSES TO PHQ QUESTIONS 1-9: 8
SUM OF ALL RESPONSES TO PHQ QUESTIONS 1-9: 8
3. TROUBLE FALLING OR STAYING ASLEEP: 1
SUM OF ALL RESPONSES TO PHQ QUESTIONS 1-9: 8
4. FEELING TIRED OR HAVING LITTLE ENERGY: 3
9. THOUGHTS THAT YOU WOULD BE BETTER OFF DEAD, OR OF HURTING YOURSELF: 0
7. TROUBLE CONCENTRATING ON THINGS, SUCH AS READING THE NEWSPAPER OR WATCHING TELEVISION: 0
5. POOR APPETITE OR OVEREATING: 1
6. FEELING BAD ABOUT YOURSELF - OR THAT YOU ARE A FAILURE OR HAVE LET YOURSELF OR YOUR FAMILY DOWN: 1
2. FEELING DOWN, DEPRESSED OR HOPELESS: 1
SUM OF ALL RESPONSES TO PHQ QUESTIONS 1-9: 8
8. MOVING OR SPEAKING SO SLOWLY THAT OTHER PEOPLE COULD HAVE NOTICED. OR THE OPPOSITE, BEING SO FIGETY OR RESTLESS THAT YOU HAVE BEEN MOVING AROUND A LOT MORE THAN USUAL: 0

## 2022-06-28 ASSESSMENT — LIFESTYLE VARIABLES
HOW OFTEN DO YOU HAVE A DRINK CONTAINING ALCOHOL: MONTHLY OR LESS
HOW MANY STANDARD DRINKS CONTAINING ALCOHOL DO YOU HAVE ON A TYPICAL DAY: 1 OR 2

## 2022-06-28 NOTE — PATIENT INSTRUCTIONS
Personalized Preventive Plan for Figueroa Singer - 6/28/2022  Medicare offers a range of preventive health benefits. Some of the tests and screenings are paid in full while other may be subject to a deductible, co-insurance, and/or copay. Some of these benefits include a comprehensive review of your medical history including lifestyle, illnesses that may run in your family, and various assessments and screenings as appropriate. After reviewing your medical record and screening and assessments performed today your provider may have ordered immunizations, labs, imaging, and/or referrals for you. A list of these orders (if applicable) as well as your Preventive Care list are included within your After Visit Summary for your review. Other Preventive Recommendations:    · A preventive eye exam performed by an eye specialist is recommended every 1-2 years to screen for glaucoma; cataracts, macular degeneration, and other eye disorders. · A preventive dental visit is recommended every 6 months. · Try to get at least 150 minutes of exercise per week or 10,000 steps per day on a pedometer . · Order or download the FREE \"Exercise & Physical Activity: Your Everyday Guide\" from [employer]. Call 4-833.491.9457 or search [employer] online. · You need 5433-6466 mg of calcium and 2421-0100 IU of vitamin D per day. It is possible to meet your calcium requirement with diet alone, but a vitamin D supplement is usually necessary to meet this goal.  When exposed to the sun, use a sunscreen that protects against both UVA and UVB radiation with an SPF of 30 or greater. Reapply every 2 to 3 hours or after sweating, drying off Patient Education        Knee: Exercises  Introduction  Here are some examples of exercises for you to try. The exercises may be suggested for a condition or for rehabilitation. Start each exercise slowly. Ease off the exercises if you start to have pain.   You will be told when to start these exercises and which ones will work bestfor you. How to do the exercises  Quad sets    Sit with your leg straight and supported on the floor or a firm bed. (If you feel discomfort in the front or back of your knee, place a small towel roll under your knee.)  Tighten the muscles on top of your thigh by pressing the back of your knee flat down to the floor. (If you feel discomfort under your kneecap, place a small towel roll under your knee.)  Hold for about 6 seconds, then rest for up to 10 seconds. Do 8 to 12 repetitions several times a day. Straight-leg raises to the front    Lie on your back with your good knee bent so that your foot rests flat on the floor. Your injured leg should be straight. Make sure that your low back has a normal curve. You should be able to slip your flat hand in between the floor and the small of your back, with your palm touching the floor and your back touching the back of your hand. Tighten the thigh muscles in the injured leg by pressing the back of your knee flat down to the floor. Hold your knee straight. Keeping the thigh muscles tight, lift your injured leg up so that your heel is about 12 inches off the floor. Hold for about 6 seconds and then lower slowly. Do 8 to 12 repetitions, 3 times a day. Straight-leg raises to the outside    Lie on your side, with your injured leg on top. Tighten the front thigh muscles of your injured leg to keep your knee straight. Keep your hip and your leg straight in line with the rest of your body, and keep your knee pointing forward. Do not drop your hip back. Lift your injured leg straight up toward the ceiling, about 12 inches off the floor. Hold for about 6 seconds, then slowly lower your leg. Do 8 to 12 repetitions. Straight-leg raises to the back    Lie on your stomach, and lift your leg straight up behind you (toward the ceiling).   Lift your toes about 6 inches off the floor, hold for about 6 seconds, then lower slowly. Do 8 to 12 repetitions. Straight-leg raises to the inside    Lie on the side of your body with the injured leg. You can either prop your other (good) leg up on a chair, or you can bend your good knee and put that foot in front of your injured knee. Do not drop your hip back. Tighten the muscles on the front of your thigh to straighten your injured knee. Keep your kneecap pointing forward, and lift your whole leg up toward the ceiling about 6 inches. Hold for about 6 seconds, then lower slowly. Do 8 to 12 repetitions. Heel dig bridging    Lie on your back with both knees bent and your ankles bent so that only your heels are digging into the floor. Your knees should be bent about 90 degrees. Then push your heels into the floor, squeeze your buttocks, and lift your hips off the floor until your shoulders, hips, and knees are all in a straight line. Hold for about 6 seconds as you continue to breathe normally, and then slowly lower your hips back down to the floor and rest for up to 10 seconds. Do 8 to 12 repetitions. Hamstring curls    Lie on your stomach with your knees straight. If your kneecap is uncomfortable, roll up a washcloth and put it under your leg just above your kneecap. Lift the foot of your injured leg by bending the knee so that you bring the foot up toward your buttock. If this motion hurts, try it without bending your knee quite as far. This may help you avoid any painful motion. Slowly lower your leg back to the floor. Do 8 to 12 repetitions. With permission from your doctor or physical therapist, you may also want to add a cuff weight to your ankle (not more than 5 pounds). With weight, you do not have to lift your leg more than 12 inches to get a hamstring workout.   Shallow standing knee bends    Do this exercise only if you have very little pain; if you have no clicking, locking, or giving way if you have an injured knee; and if it does not hurtwhile you are doing 8 to 12 repetitions. Stand with your hands lightly resting on a counter or chair in front of you. Put your feet shoulder-width apart. Slowly bend your knees so that you squat down like you are going to sit in a chair. Make sure your knees do not go in front of your toes. Lower yourself about 6 inches. Your heels should remain on the floor at all times. Rise slowly to a standing position. Heel raises    Stand with your feet a few inches apart, with your hands lightly resting on a counter or chair in front of you. Slowly raise your heels off the floor while keeping your knees straight. Hold for about 6 seconds, then slowly lower your heels to the floor. Do 8 to 12 repetitions several times during the day. Follow-up care is a key part of your treatment and safety. Be sure to make and go to all appointments, and call your doctor if you are having problems. It's also a good idea to know your test results and keep alist of the medicines you take. Where can you learn more? Go to [email or web address]. org and sign in to your [employer] account. Enter D147 in the [employer] box to learn more about \"Knee: Exercises. \"     If you do not have an account, please click on the \"Sign Up Now\" link. Current as of: March 9, 2022               Content Version: 13.3  © 0668-6039 [employer]wise, Inc[employer]. Care instructions adapted under license by [facility] ([facility]). If you have questions about a medical condition or this instruction, always ask your healthcare professional. [place] [address] any warranty or liability for your use of this information. · with a towel, or swimming. · Always wear a seat belt when traveling in a car. Always wear a helmet when riding a bicycle or motorcycle.

## 2022-06-28 NOTE — PROGRESS NOTES
Medicare Annual Wellness Visit    Key Duron is here for Medicare AWV (AWV)    Assessment & Plan   Initial Medicare annual wellness visit  Pulmonary fibrosis ([address]  Last CT 9/2021. Saw Umaña 11/2021. Reportedly is going to follow up Pulmonology this month. -     CT CHEST WO CONTRAST; Future  Colon cancer screening  -     Fecal DNA Colorectal cancer screening (Cologuard)  Post-menopausal  -     DEXA BONE DENSITY AXIAL SKELETON; Future  Gastroesophageal reflux disease without esophagitis  Continue Prilosec. Add Pepcid at this time. Gerd diet. Query hx of gallstones with previous triggers nuts/potatoe chips. Mrcp discussed. Stop Naproxen and trial Tylenol. Labs 3/2022.  -     famotidine (PEPCID) 20 MG tablet; Take 1 tablet by mouth 2 times daily, Disp-60 tablet, R-1Normal  -     Estrella Cope MD, General Surgery, [facility]  Chronic knee pain, unspecified laterality  Patient declines PT at this time. Home exercises provided and knee xrays placed. -     XR KNEE RIGHT (3 VIEWS); Future  -     XR KNEE LEFT (3 VIEWS); Future  Need for pneumococcal vaccine  -     Pneumococcal, PPSV23, PNEUMOVAX 23, (age 2 yrs+), SC/IM        Recommendations for Preventive Services Due: see orders and patient instructions/AVS.  Recommended screening schedule for the next 5-10 years is provided to the patient in written form: see Patient Instructions/AVS.     Return in 4 weeks (on 7/26/2022) for Medicare Annual Wellness Visit in 1 year. Subjective     Patient reports having balance issues since brain surgery in 2010. She declines any further work up for this. She previously had an extensive work up at [facility]. She reports her house is set up to accommodate her. She does admits to falls during the year while taking the garbage out. She denies every hitting her head or loc. She notes she typically twist her ankles and lands on her knees. She lives alone. She reports just hiring a house keeper.    In addition, she reports gas pain and belching. She takes Pepcid. She notes previous    Patient's complete Health Risk Assessment and screening values have been reviewed and are found in Flowsheets. The following problems were reviewed today and where indicated follow up appointments were made and/or referrals ordered. Positive Risk Factor Screenings with Interventions:  Discussed home safety tips. Chronic ataxia since brain surgery in 2010 with >2 falls a year. Patient declines any loc or hitting head. Reports b/l knee pain. Encourage patient to ambulate with a walker vs a cane. Knee xrays placed. Phq-9 2. Patient is on Wellbutrin for mood. Denies any s/h ideations. Encourage patient to establish with a counselor.     Fall Risk:  Do you feel unsteady or are you worried about falling? : (!) yes  2 or more falls in past year?: (!) yes  Fall with injury in past year?: (!) yes        Depression:  PHQ-2 Score: 2  PHQ-9 Total Score: 8    Severity:1-4 = minimal depression, 5-9 = mild depression, 10-14 = moderate depression, 15-19 = moderately severe depression, 20-27 = severe depression            General Health and ACP:  General  In general, how would you say your health is?: Good  In the past 7 days, have you experienced any of the following: New or Increased Pain, New or Increased Fatigue, Loneliness, Social Isolation, Stress or Anger?: (!) Yes  Select all that apply: (!) New or Increased Pain  Do you get the social and emotional support that you need?: (!) No  Do you have a Living Will?: Yes    Advance Directives     Power of  Living Will ACP-Advance Directive ACP-Power of Rob Dhillon on 01/18/22 Not on [address] Habits/Nutrition:     Physical Activity: Inactive    Days of Exercise per Week: 0 days    Minutes of Exercise per Session: 0 min     Have you lost any weight without trying in the past 3 months?: (!) Yes  Body mass index: (!) 33.11  Have you seen the dentist within the past year?: Appointment is scheduled    Health Habits/Nutrition Interventions:  · on chart review patient's weight seems stable. Encouraged patient to follow a healthy well balanced diet. ADLs:  In the past 7 days, did you need help from others to perform any of the following everyday activities: Eating, dressing, grooming, bathing, toileting, or walking/balance?: No  In the past 7 days, did you need help from others to take care of any of the following: Laundry, housekeeping, banking/finances, shopping, telephone use, food preparation, transportation, or taking medications?: (!) Yes  Select all that apply: [facility]     Patient recently hired a . Objective   Vitals:    06/28/22 1108   BP: 112/70   Pulse: 69   Resp: 18   Temp: 97.4 °F (36.3 °C)   SpO2: 94%   Weight: 199 lb (90.3 kg)   Height: 5' 5\" (1.651 m)      Body mass index is 33.12 kg/m². Review of Systems   Constitutional: Negative for appetite change, fatigue and fever. Respiratory: Negative for cough, shortness of breath and wheezing. Cardiovascular: Negative for chest pain and palpitations. Gastrointestinal: Negative for abdominal pain, constipation, diarrhea, nausea and vomiting. Gerd   Neurological: Negative for seizures and syncope. Chronic ataxia      Physical Exam  Constitutional:       General: She is not in acute distress. Appearance: She is well-developed. She is not diaphoretic. Comments: Ambulates with cane   HENT:      Head: Normocephalic and atraumatic. Eyes:      Conjunctiva/sclera: Conjunctivae normal.      Pupils: Pupils are equal, round, and reactive to light. Cardiovascular:      Rate and Rhythm: Normal rate and regular rhythm. Pulmonary:      Effort: Pulmonary effort is normal.      Breath sounds: Normal breath sounds. Abdominal:      General: Bowel sounds are normal. There is no distension. Palpations: Abdomen is soft. Tenderness: There is no abdominal tenderness.       Hernia: No hernia is present. Musculoskeletal:      Cervical back: Normal range of motion and neck supple. Skin:     General: Skin is warm and dry. Neurological:      Mental Status: She is alert and oriented to person, place, and time. Comments: Positive rhomberg           No Known Allergies  Prior to Visit Medications    Medication Sig Taking?  Authorizing Provider   famotidine (PEPCID) 20 MG tablet Take 1 tablet by mouth 2 times daily Yes Lien Key MD   Cholecalciferol (VITAMIN D3) 1.25 MG (75703 UT) CAPS Take 1 tablet q2 weeks Yes Lien Key MD   amLODIPine (NORVASC) 5 MG tablet Take 1 tablet by mouth daily Yes Abelina Sever, MD   buPROPion (WELLBUTRIN XL) 300 MG extended release tablet Take 1 tablet by mouth every morning Yes Abelina Sever, MD   omeprazole (PRILOSEC) 40 MG delayed release capsule Take 1 capsule by mouth daily Yes Abelina Sever, MD   propranolol (INDERAL LA) 60 MG extended release capsule Take 1 capsule by mouth daily Yes Abelina Sever, MD   tiZANidine (ZANAFLEX) 4 MG tablet TAKE 1 TABLET BY MOUTH TWICE DAILY Yes Abelina Sever, MD   rosuvastatin (CRESTOR) 10 MG tablet Take 1 tablet by mouth daily Yes Abelina Sever, MD   Handicap Placard [facility] UNTIL 2/11/2026 Yes Anh Alan MD       [facility] (Including outside providers/suppliers regularly involved in providing care):   Patient Care Team:  Lien Key MD as PCP - General (Family Medicine)  Lien Key MD as PCP - [facility] Empaneled Provider     Reviewed and updated this visit:  Tobacco  Allergies  Meds  Med Hx  Surg Hx  Soc Hx  Fam Hx

## 2022-06-29 RX ORDER — FAMOTIDINE 20 MG/1
TABLET, FILM COATED ORAL
Qty: 180 TABLET | OUTPATIENT
Start: 2022-06-29

## 2022-07-14 ENCOUNTER — HOSPITAL ENCOUNTER (OUTPATIENT)
Age: 77
Discharge: HOME | End: 2022-07-16
Payer: MEDICARE

## 2022-07-14 ENCOUNTER — HOSPITAL ENCOUNTER (OUTPATIENT)
Dept: CT IMAGING | Age: 77
Discharge: HOME | End: 2022-07-16
Payer: MEDICARE

## 2022-07-14 ENCOUNTER — HOSPITAL ENCOUNTER (OUTPATIENT)
Dept: GENERAL RADIOLOGY | Age: 77
Discharge: HOME | End: 2022-07-16
Payer: MEDICARE

## 2022-07-14 DIAGNOSIS — J84.10 PULMONARY FIBROSIS (HCC): ICD-10-CM

## 2022-07-14 DIAGNOSIS — M25.569 CHRONIC KNEE PAIN, UNSPECIFIED LATERALITY: ICD-10-CM

## 2022-07-14 DIAGNOSIS — G89.29 CHRONIC KNEE PAIN, UNSPECIFIED LATERALITY: ICD-10-CM

## 2022-07-14 PROCEDURE — 73562 X-RAY EXAM OF KNEE 3: CPT

## 2022-07-14 PROCEDURE — 71250 CT THORAX DX C-: CPT

## 2022-07-15 DIAGNOSIS — R91.1 PULMONARY NODULE: Primary | ICD-10-CM

## 2022-07-15 DIAGNOSIS — E55.9 VITAMIN D DEFICIENCY: ICD-10-CM

## 2022-07-15 DIAGNOSIS — K21.9 GASTROESOPHAGEAL REFLUX DISEASE WITHOUT ESOPHAGITIS: ICD-10-CM

## 2022-07-18 RX ORDER — OMEPRAZOLE 40 MG/1
40 CAPSULE, DELAYED RELEASE ORAL DAILY
Qty: 90 CAPSULE | Refills: 0 | Status: SHIPPED
Start: 2022-07-18 | End: 2022-11-01 | Stop reason: SDUPTHER

## 2022-07-18 RX ORDER — ROSUVASTATIN CALCIUM 10 MG/1
10 TABLET, COATED ORAL DAILY
Qty: 90 TABLET | Refills: 0 | Status: SHIPPED
Start: 2022-07-18 | End: 2022-11-01 | Stop reason: SDUPTHER

## 2022-07-18 RX ORDER — BUPROPION HYDROCHLORIDE 300 MG/1
300 TABLET ORAL EVERY MORNING
Qty: 90 TABLET | Refills: 0 | Status: SHIPPED
Start: 2022-07-18 | End: 2022-11-01 | Stop reason: SDUPTHER

## 2022-07-18 RX ORDER — TIZANIDINE 4 MG/1
TABLET ORAL
Qty: 90 TABLET | Refills: 1 | OUTPATIENT
Start: 2022-07-18

## 2022-07-18 RX ORDER — PROPRANOLOL HCL 60 MG
60 CAPSULE, EXTENDED RELEASE 24HR ORAL DAILY
Qty: 90 CAPSULE | Refills: 0 | Status: SHIPPED
Start: 2022-07-18 | End: 2022-11-01 | Stop reason: SDUPTHER

## 2022-07-18 RX ORDER — CHOLECALCIFEROL (VITAMIN D3) 1250 MCG
CAPSULE ORAL
Qty: 12 CAPSULE | Refills: 1 | OUTPATIENT
Start: 2022-07-18

## 2022-07-18 RX ORDER — FAMOTIDINE 20 MG/1
20 TABLET, FILM COATED ORAL 2 TIMES DAILY
Qty: 180 TABLET | Refills: 0 | Status: SHIPPED
Start: 2022-07-18 | End: 2022-10-19

## 2022-07-18 RX ORDER — AMLODIPINE BESYLATE 5 MG/1
5 TABLET ORAL DAILY
Qty: 90 TABLET | Refills: 0 | Status: SHIPPED
Start: 2022-07-18 | End: 2022-11-01 | Stop reason: SDUPTHER

## 2022-07-26 ENCOUNTER — TELEPHONE (OUTPATIENT)
Dept: FAMILY MEDICINE CLINIC | Age: 77
End: 2022-07-26

## 2022-07-26 NOTE — TELEPHONE ENCOUNTER
----- Message from Carlosjake Yin sent at 7/25/2022  4:23 PM EDT -----  Subject: Message to Provider    QUESTIONS  Information for Provider? Please contact Pt back in regards to her lab   results. ---------------------------------------------------------------------------  --------------  Raymond Grimaldo EDVN  [phone]; OK to leave message on voicemail  ---------------------------------------------------------------------------  --------------  SCRIPT ANSWERS  Relationship to Patient?  Self

## 2022-10-17 DIAGNOSIS — K21.9 GASTROESOPHAGEAL REFLUX DISEASE WITHOUT ESOPHAGITIS: ICD-10-CM

## 2022-10-19 DIAGNOSIS — K21.9 GASTROESOPHAGEAL REFLUX DISEASE WITHOUT ESOPHAGITIS: ICD-10-CM

## 2022-10-19 RX ORDER — FAMOTIDINE 20 MG/1
TABLET, FILM COATED ORAL
Qty: 60 TABLET | Refills: 1 | Status: SHIPPED
Start: 2022-10-19 | End: 2022-10-20

## 2022-10-20 RX ORDER — FAMOTIDINE 20 MG/1
TABLET, FILM COATED ORAL
Qty: 180 TABLET | Refills: 0 | Status: SHIPPED
Start: 2022-10-20 | End: 2022-11-01 | Stop reason: SDUPTHER

## 2022-11-01 ENCOUNTER — OFFICE VISIT (OUTPATIENT)
Dept: FAMILY MEDICINE CLINIC | Age: 77
End: 2022-11-01
Payer: MEDICARE

## 2022-11-01 VITALS
TEMPERATURE: 97.2 F | HEART RATE: 73 BPM | DIASTOLIC BLOOD PRESSURE: 76 MMHG | WEIGHT: 195 LBS | OXYGEN SATURATION: 96 % | SYSTOLIC BLOOD PRESSURE: 114 MMHG | RESPIRATION RATE: 16 BRPM | HEIGHT: 65 IN | BODY MASS INDEX: 32.49 KG/M2

## 2022-11-01 DIAGNOSIS — R73.03 PREDIABETES: ICD-10-CM

## 2022-11-01 DIAGNOSIS — F39 MOOD DISORDER (HCC): ICD-10-CM

## 2022-11-01 DIAGNOSIS — M54.2 CHRONIC NECK PAIN: ICD-10-CM

## 2022-11-01 DIAGNOSIS — K59.00 CONSTIPATION, UNSPECIFIED CONSTIPATION TYPE: ICD-10-CM

## 2022-11-01 DIAGNOSIS — I10 ESSENTIAL HYPERTENSION: Primary | ICD-10-CM

## 2022-11-01 DIAGNOSIS — E55.9 VITAMIN D DEFICIENCY: ICD-10-CM

## 2022-11-01 DIAGNOSIS — I10 ESSENTIAL HYPERTENSION: ICD-10-CM

## 2022-11-01 DIAGNOSIS — K21.9 GASTROESOPHAGEAL REFLUX DISEASE WITHOUT ESOPHAGITIS: ICD-10-CM

## 2022-11-01 DIAGNOSIS — J30.2 SEASONAL ALLERGIES: ICD-10-CM

## 2022-11-01 DIAGNOSIS — G89.29 CHRONIC NECK PAIN: ICD-10-CM

## 2022-11-01 DIAGNOSIS — Z23 FLU VACCINE NEED: ICD-10-CM

## 2022-11-01 LAB
ALBUMIN SERPL-MCNC: 4.5 G/DL (ref 3.5–5.2)
ALP BLD-CCNC: 96 U/L (ref 35–104)
ALT SERPL-CCNC: 14 U/L (ref 0–32)
ANION GAP SERPL CALCULATED.3IONS-SCNC: 12 MMOL/L (ref 7–16)
AST SERPL-CCNC: 14 U/L (ref 0–31)
BILIRUB SERPL-MCNC: 0.4 MG/DL (ref 0–1.2)
BUN BLDV-MCNC: 12 MG/DL (ref 6–23)
CALCIUM SERPL-MCNC: 9.9 MG/DL (ref 8.6–10.2)
CHLORIDE BLD-SCNC: 102 MMOL/L (ref 98–107)
CO2: 27 MMOL/L (ref 22–29)
CREAT SERPL-MCNC: 0.7 MG/DL (ref 0.5–1)
GFR SERPL CREATININE-BSD FRML MDRD: >60 ML/MIN/1.73
GLUCOSE BLD-MCNC: 107 MG/DL (ref 74–99)
HBA1C MFR BLD: 5.5 % (ref 4–5.6)
HCT VFR BLD CALC: 46.5 % (ref 34–48)
HEMOGLOBIN: 15.1 G/DL (ref 11.5–15.5)
MCH RBC QN AUTO: 30.9 PG (ref 26–35)
MCHC RBC AUTO-ENTMCNC: 32.5 % (ref 32–34.5)
MCV RBC AUTO: 95.3 FL (ref 80–99.9)
PDW BLD-RTO: 13.9 FL (ref 11.5–15)
PLATELET # BLD: 330 E9/L (ref 130–450)
PMV BLD AUTO: 10.5 FL (ref 7–12)
POTASSIUM SERPL-SCNC: 3.8 MMOL/L (ref 3.5–5)
RBC # BLD: 4.88 E12/L (ref 3.5–5.5)
SODIUM BLD-SCNC: 141 MMOL/L (ref 132–146)
TOTAL PROTEIN: 7.4 G/DL (ref 6.4–8.3)
VITAMIN D 25-HYDROXY: 28 NG/ML (ref 30–100)
WBC # BLD: 6.7 E9/L (ref 4.5–11.5)

## 2022-11-01 PROCEDURE — 90694 VACC AIIV4 NO PRSRV 0.5ML IM: CPT | Performed by: FAMILY MEDICINE

## 2022-11-01 PROCEDURE — G8484 FLU IMMUNIZE NO ADMIN: HCPCS | Performed by: FAMILY MEDICINE

## 2022-11-01 PROCEDURE — 36415 COLL VENOUS BLD VENIPUNCTURE: CPT | Performed by: FAMILY MEDICINE

## 2022-11-01 PROCEDURE — 3078F DIAST BP <80 MM HG: CPT | Performed by: FAMILY MEDICINE

## 2022-11-01 PROCEDURE — G8400 PT W/DXA NO RESULTS DOC: HCPCS | Performed by: FAMILY MEDICINE

## 2022-11-01 PROCEDURE — G8427 DOCREV CUR MEDS BY ELIG CLIN: HCPCS | Performed by: FAMILY MEDICINE

## 2022-11-01 PROCEDURE — 1123F ACP DISCUSS/DSCN MKR DOCD: CPT | Performed by: FAMILY MEDICINE

## 2022-11-01 PROCEDURE — 3074F SYST BP LT 130 MM HG: CPT | Performed by: FAMILY MEDICINE

## 2022-11-01 PROCEDURE — G8417 CALC BMI ABV UP PARAM F/U: HCPCS | Performed by: FAMILY MEDICINE

## 2022-11-01 PROCEDURE — 99214 OFFICE O/P EST MOD 30 MIN: CPT | Performed by: FAMILY MEDICINE

## 2022-11-01 PROCEDURE — G0008 ADMIN INFLUENZA VIRUS VAC: HCPCS | Performed by: FAMILY MEDICINE

## 2022-11-01 PROCEDURE — 3017F COLORECTAL CA SCREEN DOC REV: CPT | Performed by: FAMILY MEDICINE

## 2022-11-01 PROCEDURE — 1090F PRES/ABSN URINE INCON ASSESS: CPT | Performed by: FAMILY MEDICINE

## 2022-11-01 PROCEDURE — 1036F TOBACCO NON-USER: CPT | Performed by: FAMILY MEDICINE

## 2022-11-01 RX ORDER — BUPROPION HYDROCHLORIDE 300 MG/1
300 TABLET ORAL EVERY MORNING
Qty: 90 TABLET | Refills: 0 | Status: SHIPPED | OUTPATIENT
Start: 2022-11-01

## 2022-11-01 RX ORDER — FAMOTIDINE 20 MG/1
TABLET, FILM COATED ORAL
Qty: 180 TABLET | Refills: 0 | Status: SHIPPED
Start: 2022-11-01 | End: 2022-11-18

## 2022-11-01 RX ORDER — ROSUVASTATIN CALCIUM 10 MG/1
10 TABLET, COATED ORAL DAILY
Qty: 90 TABLET | Refills: 0 | Status: SHIPPED | OUTPATIENT
Start: 2022-11-01

## 2022-11-01 RX ORDER — PROPRANOLOL HCL 60 MG
60 CAPSULE, EXTENDED RELEASE 24HR ORAL DAILY
Qty: 90 CAPSULE | Refills: 0 | Status: SHIPPED | OUTPATIENT
Start: 2022-11-01

## 2022-11-01 RX ORDER — AMLODIPINE BESYLATE 5 MG/1
5 TABLET ORAL DAILY
Qty: 90 TABLET | Refills: 0 | Status: SHIPPED | OUTPATIENT
Start: 2022-11-01

## 2022-11-01 RX ORDER — CETIRIZINE HYDROCHLORIDE 10 MG/1
10 TABLET ORAL DAILY
Qty: 90 TABLET | Refills: 0 | Status: SHIPPED | OUTPATIENT
Start: 2022-11-01 | End: 2022-12-01

## 2022-11-01 RX ORDER — CHOLECALCIFEROL (VITAMIN D3) 1250 MCG
CAPSULE ORAL
Qty: 6 CAPSULE | Refills: 3 | Status: CANCELLED | OUTPATIENT
Start: 2022-11-01

## 2022-11-01 RX ORDER — OMEPRAZOLE 40 MG/1
40 CAPSULE, DELAYED RELEASE ORAL DAILY
Qty: 90 CAPSULE | Refills: 0 | Status: SHIPPED | OUTPATIENT
Start: 2022-11-01

## 2022-11-01 RX ORDER — DOCUSATE SODIUM 100 MG/1
100 CAPSULE, LIQUID FILLED ORAL 2 TIMES DAILY PRN
Qty: 60 CAPSULE | Refills: 0 | Status: SHIPPED | OUTPATIENT
Start: 2022-11-01 | End: 2022-12-01

## 2022-11-01 RX ORDER — TIZANIDINE 4 MG/1
TABLET ORAL
Qty: 180 TABLET | Refills: 0 | Status: SHIPPED | OUTPATIENT
Start: 2022-11-01

## 2022-11-01 NOTE — PROGRESS NOTES
[address] [facility] Medicine Outpatient        SUBJECTIVE:  CC: had concerns including Hypertension (Follow up.) and Medication Refill (Pharmacy verified and meds pended. ). HPI:  Michelle Murphy is a female 78 y.o. presented to the clinic for an established visit. Pepcid was added last visit and she feels like it is really helping. Review of Systems   Constitutional:  Negative for appetite change, fatigue and fever. Respiratory:  Negative for cough, shortness of breath and wheezing. Cardiovascular:  Negative for chest pain and palpitations. Gastrointestinal:  Positive for constipation. Negative for abdominal pain, diarrhea, nausea and vomiting. Gerd   Musculoskeletal:  Positive for arthralgias and neck pain. Negative for back pain.      Outpatient Medications Marked as Taking for the 11/1/22 encounter (Office Visit) with Deshawn Garg MD   Medication Sig Dispense Refill    vitamin D (ERGOCALCIFEROL) 1.25 MG (70714 UT) CAPS capsule Take 1 capsule by mouth once a week 12 capsule 0    famotidine (PEPCID) 20 MG tablet TAKE 1 TABLET BY MOUTH EVERY MORNING AND EVERY NIGHT AT BEDTIME 180 tablet 0    amLODIPine (NORVASC) 5 MG tablet Take 1 tablet by mouth daily 90 tablet 0    buPROPion (WELLBUTRIN XL) 300 MG extended release tablet Take 1 tablet by mouth every morning 90 tablet 0    omeprazole (PRILOSEC) 40 MG delayed release capsule Take 1 capsule by mouth daily 90 capsule 0    propranolol (INDERAL LA) 60 MG extended release capsule Take 1 capsule by mouth daily 90 capsule 0    rosuvastatin (CRESTOR) 10 MG tablet Take 1 tablet by mouth daily 90 tablet 0    tiZANidine (ZANAFLEX) 4 MG tablet TAKE 1 TABLET BY MOUTH TWICE DAILY 180 tablet 0    cetirizine (ZYRTEC) 10 MG tablet Take 1 tablet by mouth daily 90 tablet 0    docusate sodium (COLACE) 100 MG capsule Take 1 capsule by mouth 2 times daily as needed for Constipation 60 capsule 0    Cholecalciferol (VITAMIN D3) 1.25 MG (76407 UT) CAPS Take 1 tablet q2 weeks 6 capsule 0    Handicap Placard [facility] UNTIL 2/11/2026 1 each 0       I have reviewed all pertinent PMHx, PSHx, FamHx, SocialHx, medications, and allergies and updated history as appropriate. OBJECTIVE    VS: /76   Pulse 73   Temp 97.2 °F (36.2 °C)   Resp 16   Ht 5' 5\" (1.651 m)   Wt 195 lb (88.5 kg)   LMP  (LMP Unknown)   SpO2 96%   BMI 32.45 kg/m²   Physical Exam  Constitutional:       General: She is not in acute distress. Appearance: She is well-developed. She is obese. She is not diaphoretic. Comments: Ambulating with cane   HENT:      Head: Normocephalic and atraumatic. Eyes:      Conjunctiva/sclera: Conjunctivae normal.      Pupils: Pupils are equal, round, and reactive to light. Cardiovascular:      Rate and Rhythm: Normal rate and regular rhythm. Pulmonary:      Effort: Pulmonary effort is normal.      Breath sounds: Normal breath sounds. Abdominal:      General: Bowel sounds are normal. There is no distension. Palpations: Abdomen is soft. Tenderness: There is no abdominal tenderness. Hernia: No hernia is present. Musculoskeletal:      Cervical back: Normal range of motion and neck supple. Skin:     General: Skin is warm and dry. Neurological:      Mental Status: She is alert and oriented to person, place, and time. ASSESSMENT/PLAN:  1. Essential hypertension  Stable. Continue current regimen. - amLODIPine (NORVASC) 5 MG tablet; Take 1 tablet by mouth daily  Dispense: 90 tablet; Refill: 0  - propranolol (INDERAL LA) 60 MG extended release capsule; Take 1 capsule by mouth daily  Dispense: 90 capsule; Refill: 0  - CBC; Future  - Comprehensive Metabolic Panel; Future    2. Gastroesophageal reflux disease without esophagitis  Improved. Continue current regimen. GERD diet. Patient referred to gen surgery at the end of June to discuss mrcp +/- cholecystomy. Patient declines at this time knowing, understanding, and accepting the risk.   Increase water and fiber in diet. Prn colace. - famotidine (PEPCID) 20 MG tablet; TAKE 1 TABLET BY MOUTH EVERY MORNING AND EVERY NIGHT AT BEDTIME  Dispense: 180 tablet; Refill: 0  - omeprazole (PRILOSEC) 40 MG delayed release capsule; Take 1 capsule by mouth daily  Dispense: 90 capsule; Refill: 0    3. Vitamin D deficiency  - Hemoglobin A1C; Future  - Vitamin D 25 Hydroxy; Future    4. Prediabetes  - rosuvastatin (CRESTOR) 10 MG tablet; Take 1 tablet by mouth daily  Dispense: 90 tablet; Refill: 0  - Hemoglobin A1C; Future    5. Chronic neck pain  Reports Arthritis on previous imaging. Prn Zanaflex and Tylenol. - tiZANidine (ZANAFLEX) 4 MG tablet; TAKE 1 TABLET BY MOUTH TWICE DAILY  Dispense: 180 tablet; Refill: 0    6. Seasonal allergies  - cetirizine (ZYRTEC) 10 MG tablet; Take 1 tablet by mouth daily  Dispense: 90 tablet; Refill: 0    7. Mood disorder (HCC)  - buPROPion (WELLBUTRIN XL) 300 MG extended release tablet; Take 1 tablet by mouth every morning  Dispense: 90 tablet; Refill: 0    8. Constipation, unspecified constipation type  Increase hydration and fiber. Colace regimen.  - docusate sodium (COLACE) 100 MG capsule; Take 1 capsule by mouth 2 times daily as needed for Constipation  Dispense: 60 capsule; Refill: 0    9. Flu vaccine need  - Influenza, FLUAD, (age 72 y+), IM, Preservative Free, 0.5 mL    I have reviewed my findings and recommendations with Stacy Sorto MD  11/6/2022 12:25 AM  Return in about 6 months (around 5/1/2023). Counseled regarding above diagnosis, including possible risks and complications, especially if left uncontrolled. Patient counseled on red flag symptoms and if they occur to go to the ED. Discussed medications risk/benefits and possible side effects and alternatives to treatment. Patient and/or guardian verbalizes understanding, agrees, feels comfortable with and wishes to proceed with above treatment plan.       Advised patient regarding importance of keeping up with recommended health maintenance and to schedule as soon as possible if overdue, as this is important in assessing for undiagnosed pathology, especially cancer, as well as protecting against potentially harmful/life threatening disease. Patient and/or guardian verbalizes understanding and agrees with above counseling, assessment and plan. All questions answered. Please note this report has been partially produced using speech recognition software  and may contain errors related to that system including grammar, punctuation and spelling as well as words and phrases that may seem inappropriate. If there are questions or concerns please feel free to contact me to clarify.

## 2022-11-02 RX ORDER — ERGOCALCIFEROL 1.25 MG/1
50000 CAPSULE ORAL WEEKLY
Qty: 12 CAPSULE | Refills: 0 | Status: SHIPPED | OUTPATIENT
Start: 2022-11-02

## 2022-11-06 ASSESSMENT — ENCOUNTER SYMPTOMS
CONSTIPATION: 1
COUGH: 0
ABDOMINAL PAIN: 0
SHORTNESS OF BREATH: 0
NAUSEA: 0
WHEEZING: 0
DIARRHEA: 0
BACK PAIN: 0
VOMITING: 0

## 2022-11-16 DIAGNOSIS — K21.9 GASTROESOPHAGEAL REFLUX DISEASE WITHOUT ESOPHAGITIS: ICD-10-CM

## 2022-11-18 RX ORDER — FAMOTIDINE 20 MG/1
TABLET, FILM COATED ORAL
Qty: 180 TABLET | Refills: 0 | Status: SHIPPED | OUTPATIENT
Start: 2022-11-18

## 2023-01-03 ENCOUNTER — OFFICE VISIT (OUTPATIENT)
Dept: FAMILY MEDICINE CLINIC | Age: 78
End: 2023-01-03
Payer: MEDICARE

## 2023-01-03 VITALS
RESPIRATION RATE: 18 BRPM | TEMPERATURE: 97.3 F | WEIGHT: 192 LBS | BODY MASS INDEX: 31.99 KG/M2 | DIASTOLIC BLOOD PRESSURE: 84 MMHG | HEART RATE: 60 BPM | SYSTOLIC BLOOD PRESSURE: 136 MMHG | HEIGHT: 65 IN | OXYGEN SATURATION: 97 %

## 2023-01-03 DIAGNOSIS — F39 MOOD DISORDER (HCC): ICD-10-CM

## 2023-01-03 DIAGNOSIS — M19.90 ARTHRITIS: ICD-10-CM

## 2023-01-03 DIAGNOSIS — E55.9 VITAMIN D DEFICIENCY: ICD-10-CM

## 2023-01-03 DIAGNOSIS — I10 ESSENTIAL HYPERTENSION: Primary | ICD-10-CM

## 2023-01-03 PROCEDURE — G8427 DOCREV CUR MEDS BY ELIG CLIN: HCPCS | Performed by: FAMILY MEDICINE

## 2023-01-03 PROCEDURE — 3078F DIAST BP <80 MM HG: CPT | Performed by: FAMILY MEDICINE

## 2023-01-03 PROCEDURE — 99214 OFFICE O/P EST MOD 30 MIN: CPT | Performed by: FAMILY MEDICINE

## 2023-01-03 PROCEDURE — G8400 PT W/DXA NO RESULTS DOC: HCPCS | Performed by: FAMILY MEDICINE

## 2023-01-03 PROCEDURE — G8484 FLU IMMUNIZE NO ADMIN: HCPCS | Performed by: FAMILY MEDICINE

## 2023-01-03 PROCEDURE — 3074F SYST BP LT 130 MM HG: CPT | Performed by: FAMILY MEDICINE

## 2023-01-03 PROCEDURE — G8417 CALC BMI ABV UP PARAM F/U: HCPCS | Performed by: FAMILY MEDICINE

## 2023-01-03 PROCEDURE — 3017F COLORECTAL CA SCREEN DOC REV: CPT | Performed by: FAMILY MEDICINE

## 2023-01-03 PROCEDURE — 1036F TOBACCO NON-USER: CPT | Performed by: FAMILY MEDICINE

## 2023-01-03 PROCEDURE — 1123F ACP DISCUSS/DSCN MKR DOCD: CPT | Performed by: FAMILY MEDICINE

## 2023-01-03 PROCEDURE — 1090F PRES/ABSN URINE INCON ASSESS: CPT | Performed by: FAMILY MEDICINE

## 2023-01-03 RX ORDER — PAROXETINE 10 MG/1
10 TABLET, FILM COATED ORAL DAILY
Qty: 30 TABLET | Refills: 2 | Status: SHIPPED | OUTPATIENT
Start: 2023-01-03

## 2023-01-03 ASSESSMENT — PATIENT HEALTH QUESTIONNAIRE - PHQ9
2. FEELING DOWN, DEPRESSED OR HOPELESS: 1
SUM OF ALL RESPONSES TO PHQ QUESTIONS 1-9: 4
5. POOR APPETITE OR OVEREATING: 0
1. LITTLE INTEREST OR PLEASURE IN DOING THINGS: 1
7. TROUBLE CONCENTRATING ON THINGS, SUCH AS READING THE NEWSPAPER OR WATCHING TELEVISION: 0
4. FEELING TIRED OR HAVING LITTLE ENERGY: 0
9. THOUGHTS THAT YOU WOULD BE BETTER OFF DEAD, OR OF HURTING YOURSELF: 0
SUM OF ALL RESPONSES TO PHQ9 QUESTIONS 1 & 2: 2
6. FEELING BAD ABOUT YOURSELF - OR THAT YOU ARE A FAILURE OR HAVE LET YOURSELF OR YOUR FAMILY DOWN: 1
10. IF YOU CHECKED OFF ANY PROBLEMS, HOW DIFFICULT HAVE THESE PROBLEMS MADE IT FOR YOU TO DO YOUR WORK, TAKE CARE OF THINGS AT HOME, OR GET ALONG WITH OTHER PEOPLE: 0
3. TROUBLE FALLING OR STAYING ASLEEP: 1
SUM OF ALL RESPONSES TO PHQ QUESTIONS 1-9: 4
8. MOVING OR SPEAKING SO SLOWLY THAT OTHER PEOPLE COULD HAVE NOTICED. OR THE OPPOSITE, BEING SO FIGETY OR RESTLESS THAT YOU HAVE BEEN MOVING AROUND A LOT MORE THAN USUAL: 0

## 2023-01-03 NOTE — PROGRESS NOTES
[facility])  Family Medicine Outpatient        SUBJECTIVE:  CC: had concerns including Hypertension (Pt here for a follow up on her blood pressure. ) and Results ( Would like to discuss lab results from 11/2022.). HPI:  Parjmit Bravo is a female 78 y.o. presented to the clinic for an established visit. She had recent labs done in November she would like to review. She reports general soreness from her arthritis that has been bothering her. She notes that it gets worse with rain and cold weather. She did fall last week after tripping over her sweeper. Denies any loc or hitting her head. She states she was not injured. She states she feels less motivated to do things that she used to enjoy. Reports fatigue. She lays in bed at night thinking too much. She denies any gasping or choking while sleeping. She has tried melatonin 10 mg but states she felt too groggy in the morning. No S/H ideations. Review of Systems   Constitutional:  Negative for appetite change, fatigue and fever. Respiratory:  Negative for cough, shortness of breath and wheezing. Cardiovascular:  Negative for chest pain and palpitations. Gastrointestinal:  Negative for abdominal pain, constipation, diarrhea, nausea and vomiting. Psychiatric/Behavioral:  Positive for sleep disturbance. Negative for suicidal ideas.          Depression     Outpatient Medications Marked as Taking for the 1/3/23 encounter (Office Visit) with Nigel Lovell MD   Medication Sig Dispense Refill    PARoxetine (PAXIL) 10 MG tablet Take 1 tablet by mouth daily 30 tablet 2    famotidine (PEPCID) 20 MG tablet TAKE 1 TABLET BY MOUTH EVERY MORNING AND EVERY NIGHT AT BEDTIME 180 tablet 0    vitamin D (ERGOCALCIFEROL) 1.25 MG (55245 UT) CAPS capsule Take 1 capsule by mouth once a week 12 capsule 0    amLODIPine (NORVASC) 5 MG tablet Take 1 tablet by mouth daily 90 tablet 0    buPROPion (WELLBUTRIN XL) 300 MG extended release tablet Take 1 tablet by mouth every morning 90 tablet 0    omeprazole (PRILOSEC) 40 MG delayed release capsule Take 1 capsule by mouth daily 90 capsule 0    propranolol (INDERAL LA) 60 MG extended release capsule Take 1 capsule by mouth daily 90 capsule 0    rosuvastatin (CRESTOR) 10 MG tablet Take 1 tablet by mouth daily 90 tablet 0    tiZANidine (ZANAFLEX) 4 MG tablet TAKE 1 TABLET BY MOUTH TWICE DAILY 180 tablet 0    Cholecalciferol (VITAMIN D3) 1.25 MG (48995 UT) CAPS Take 1 tablet q2 weeks 6 capsule 0    Handicap Placard MISC UNTIL 2/11/2026 1 each 0       I have reviewed all pertinent PMHx, PSHx, FamHx, SocialHx, medications, and allergies and updated history as appropriate. OBJECTIVE    VS: /84   Pulse 60   Temp 97.3 °F (36.3 °C) (Temporal)   Resp 18   Ht 5' 5\" (1.651 m)   Wt 192 lb (87.1 kg)   LMP  (LMP Unknown)   SpO2 97%   Breastfeeding No   BMI 31.95 kg/m²   Physical Exam  Constitutional:       General: She is not in acute distress. Appearance: She is well-developed. She is not diaphoretic. Comments: Ambulating with cane   HENT:      Head: Normocephalic and atraumatic. Eyes:      Conjunctiva/sclera: Conjunctivae normal.      Pupils: Pupils are equal, round, and reactive to light. Cardiovascular:      Rate and Rhythm: Normal rate and regular rhythm. Pulmonary:      Effort: Pulmonary effort is normal.      Breath sounds: Normal breath sounds. Abdominal:      General: Bowel sounds are normal. There is no distension. Palpations: Abdomen is soft. Tenderness: There is no abdominal tenderness. Hernia: No hernia is present. Musculoskeletal:      Cervical back: Normal range of motion and neck supple. Skin:     General: Skin is warm and dry. Neurological:      Mental Status: She is alert and oriented to person, place, and time. ASSESSMENT/PLAN:  1. Essential hypertension  Stable. Continue current regimen.     2. BMI 31.0-31.9,adult  Encourage weight reduction with well-balanced diet and exercise as tolerated. 3. Mood disorder ([facility] [address]  Patient reports some depressed mood with difficulty sleeping because of too much on her mind. Advised on sleep hygiene. Trial of 5 mg nightly of melatonin. Also initiate Paxil at this time for mood. Continue to monitor.  - PARoxetine (PAXIL) 10 MG tablet; Take 1 tablet by mouth daily  Dispense: 30 tablet; Refill: 2    4. Arthritis  Prn Tylenol. 5. Vitamin D deficiency  Vit d is low. High dose vit d sent. Complete high dose vit d recommend 3,000u qd otc vit d. I have reviewed my findings and recommendations with Mahi Saxena MD  1/4/2023 4:14 PM  Return in about 2 months (around 3/3/2023). Counseled regarding above diagnosis, including possible risks and complications, especially if left uncontrolled. Patient counseled on red flag symptoms and if they occur to go to the ED. Discussed medications risk/benefits and possible side effects and alternatives to treatment. Patient and/or guardian verbalizes understanding, agrees, feels comfortable with and wishes to proceed with above treatment plan. Advised patient regarding importance of keeping up with recommended health maintenance and to schedule as soon as possible if overdue, as this is important in assessing for undiagnosed pathology, especially cancer, as well as protecting against potentially harmful/life threatening disease. Patient and/or guardian verbalizes understanding and agrees with above counseling, assessment and plan. All questions answered. Please note this report has been partially produced using speech recognition software  and may contain errors related to that system including grammar, punctuation and spelling as well as words and phrases that may seem inappropriate. If there are questions or concerns please feel free to contact me to clarify.

## 2023-01-04 PROBLEM — F39 MOOD DISORDER (HCC): Status: ACTIVE | Noted: 2021-06-14

## 2023-01-04 PROBLEM — M19.90 ARTHRITIS: Status: ACTIVE | Noted: 2023-01-04

## 2023-01-04 ASSESSMENT — ENCOUNTER SYMPTOMS
COUGH: 0
CONSTIPATION: 0
DIARRHEA: 0
SHORTNESS OF BREATH: 0
NAUSEA: 0
WHEEZING: 0
ABDOMINAL PAIN: 0
VOMITING: 0

## 2023-01-24 RX ORDER — ERGOCALCIFEROL 1.25 MG/1
CAPSULE ORAL
Qty: 12 CAPSULE | Refills: 0 | Status: SHIPPED | OUTPATIENT
Start: 2023-01-24

## 2023-01-30 DIAGNOSIS — G89.29 CHRONIC NECK PAIN: ICD-10-CM

## 2023-01-30 DIAGNOSIS — I10 ESSENTIAL HYPERTENSION: ICD-10-CM

## 2023-01-30 DIAGNOSIS — M54.2 CHRONIC NECK PAIN: ICD-10-CM

## 2023-01-30 DIAGNOSIS — R73.03 PREDIABETES: ICD-10-CM

## 2023-01-30 DIAGNOSIS — F39 MOOD DISORDER (HCC): ICD-10-CM

## 2023-01-30 DIAGNOSIS — K21.9 GASTROESOPHAGEAL REFLUX DISEASE WITHOUT ESOPHAGITIS: ICD-10-CM

## 2023-01-30 RX ORDER — PROPRANOLOL HCL 60 MG
60 CAPSULE, EXTENDED RELEASE 24HR ORAL DAILY
Qty: 90 CAPSULE | Refills: 0 | Status: SHIPPED | OUTPATIENT
Start: 2023-01-30

## 2023-01-30 RX ORDER — BUPROPION HYDROCHLORIDE 300 MG/1
300 TABLET ORAL EVERY MORNING
Qty: 90 TABLET | Refills: 0 | Status: SHIPPED | OUTPATIENT
Start: 2023-01-30

## 2023-01-30 RX ORDER — TIZANIDINE 4 MG/1
TABLET ORAL
Qty: 180 TABLET | Refills: 0 | Status: SHIPPED | OUTPATIENT
Start: 2023-01-30

## 2023-01-30 RX ORDER — AMLODIPINE BESYLATE 5 MG/1
5 TABLET ORAL DAILY
Qty: 90 TABLET | Refills: 0 | Status: SHIPPED | OUTPATIENT
Start: 2023-01-30

## 2023-01-30 RX ORDER — ROSUVASTATIN CALCIUM 10 MG/1
10 TABLET, COATED ORAL DAILY
Qty: 90 TABLET | Refills: 0 | Status: SHIPPED | OUTPATIENT
Start: 2023-01-30

## 2023-01-30 RX ORDER — OMEPRAZOLE 40 MG/1
40 CAPSULE, DELAYED RELEASE ORAL DAILY
Qty: 90 CAPSULE | Refills: 0 | Status: SHIPPED | OUTPATIENT
Start: 2023-01-30

## 2023-01-30 NOTE — TELEPHONE ENCOUNTER
Last seen 1/3/2023  Next appt 4/4/2023    Electronically signed by Yifan Joseph on 1/30/23 at 3:16 PM EST

## 2023-04-04 ENCOUNTER — OFFICE VISIT (OUTPATIENT)
Dept: FAMILY MEDICINE CLINIC | Age: 78
End: 2023-04-04
Payer: MEDICARE

## 2023-04-04 VITALS
HEART RATE: 68 BPM | HEIGHT: 65 IN | SYSTOLIC BLOOD PRESSURE: 126 MMHG | WEIGHT: 187 LBS | BODY MASS INDEX: 31.16 KG/M2 | TEMPERATURE: 97.2 F | OXYGEN SATURATION: 96 % | DIASTOLIC BLOOD PRESSURE: 80 MMHG | RESPIRATION RATE: 18 BRPM

## 2023-04-04 DIAGNOSIS — K59.09 CHRONIC CONSTIPATION: ICD-10-CM

## 2023-04-04 DIAGNOSIS — R91.1 PULMONARY NODULE: ICD-10-CM

## 2023-04-04 DIAGNOSIS — G89.29 CHRONIC NECK PAIN: ICD-10-CM

## 2023-04-04 DIAGNOSIS — R53.83 FATIGUE, UNSPECIFIED TYPE: ICD-10-CM

## 2023-04-04 DIAGNOSIS — Z13.220 LIPID SCREENING: ICD-10-CM

## 2023-04-04 DIAGNOSIS — R73.03 PREDIABETES: ICD-10-CM

## 2023-04-04 DIAGNOSIS — M54.2 CHRONIC NECK PAIN: ICD-10-CM

## 2023-04-04 DIAGNOSIS — I10 ESSENTIAL HYPERTENSION: ICD-10-CM

## 2023-04-04 DIAGNOSIS — R63.0 DECREASED APPETITE: ICD-10-CM

## 2023-04-04 DIAGNOSIS — F39 MOOD DISORDER (HCC): Primary | ICD-10-CM

## 2023-04-04 DIAGNOSIS — K21.9 GASTROESOPHAGEAL REFLUX DISEASE WITHOUT ESOPHAGITIS: ICD-10-CM

## 2023-04-04 DIAGNOSIS — E55.9 VITAMIN D DEFICIENCY: ICD-10-CM

## 2023-04-04 PROCEDURE — G8427 DOCREV CUR MEDS BY ELIG CLIN: HCPCS | Performed by: FAMILY MEDICINE

## 2023-04-04 PROCEDURE — G8417 CALC BMI ABV UP PARAM F/U: HCPCS | Performed by: FAMILY MEDICINE

## 2023-04-04 PROCEDURE — 1090F PRES/ABSN URINE INCON ASSESS: CPT | Performed by: FAMILY MEDICINE

## 2023-04-04 PROCEDURE — 1036F TOBACCO NON-USER: CPT | Performed by: FAMILY MEDICINE

## 2023-04-04 PROCEDURE — 1123F ACP DISCUSS/DSCN MKR DOCD: CPT | Performed by: FAMILY MEDICINE

## 2023-04-04 PROCEDURE — 3074F SYST BP LT 130 MM HG: CPT | Performed by: FAMILY MEDICINE

## 2023-04-04 PROCEDURE — 3078F DIAST BP <80 MM HG: CPT | Performed by: FAMILY MEDICINE

## 2023-04-04 PROCEDURE — G8400 PT W/DXA NO RESULTS DOC: HCPCS | Performed by: FAMILY MEDICINE

## 2023-04-04 PROCEDURE — 99215 OFFICE O/P EST HI 40 MIN: CPT | Performed by: FAMILY MEDICINE

## 2023-04-04 RX ORDER — CHOLECALCIFEROL (VITAMIN D3) 1250 MCG
CAPSULE ORAL
Qty: 6 CAPSULE | Refills: 0 | Status: SHIPPED | OUTPATIENT
Start: 2023-04-04

## 2023-04-04 RX ORDER — BUPROPION HYDROCHLORIDE 300 MG/1
300 TABLET ORAL EVERY MORNING
Qty: 90 TABLET | Refills: 1 | Status: SHIPPED | OUTPATIENT
Start: 2023-04-04 | End: 2023-10-01

## 2023-04-04 RX ORDER — AMLODIPINE BESYLATE 5 MG/1
5 TABLET ORAL DAILY
Qty: 90 TABLET | Refills: 1 | Status: SHIPPED | OUTPATIENT
Start: 2023-04-04 | End: 2023-10-01

## 2023-04-04 RX ORDER — ROSUVASTATIN CALCIUM 10 MG/1
10 TABLET, COATED ORAL DAILY
Qty: 90 TABLET | Refills: 1 | Status: SHIPPED | OUTPATIENT
Start: 2023-04-04 | End: 2023-10-01

## 2023-04-04 RX ORDER — PAROXETINE 10 MG/1
10 TABLET, FILM COATED ORAL DAILY
Qty: 90 TABLET | Refills: 1 | Status: SHIPPED | OUTPATIENT
Start: 2023-04-04 | End: 2023-10-01

## 2023-04-04 RX ORDER — PROPRANOLOL HCL 60 MG
60 CAPSULE, EXTENDED RELEASE 24HR ORAL DAILY
Qty: 90 CAPSULE | Refills: 1 | Status: SHIPPED | OUTPATIENT
Start: 2023-04-04 | End: 2023-10-01

## 2023-04-04 RX ORDER — TIZANIDINE 4 MG/1
TABLET ORAL
Qty: 180 TABLET | Refills: 0 | Status: SHIPPED | OUTPATIENT
Start: 2023-04-04

## 2023-04-04 RX ORDER — ERGOCALCIFEROL 1.25 MG/1
50000 CAPSULE ORAL WEEKLY
Qty: 12 CAPSULE | Refills: 0 | Status: SHIPPED | OUTPATIENT
Start: 2023-04-04 | End: 2023-07-03

## 2023-04-04 RX ORDER — OMEPRAZOLE 40 MG/1
40 CAPSULE, DELAYED RELEASE ORAL DAILY
Qty: 90 CAPSULE | Refills: 1 | Status: SHIPPED | OUTPATIENT
Start: 2023-04-04 | End: 2023-10-01

## 2023-04-04 RX ORDER — FAMOTIDINE 20 MG/1
20 TABLET, FILM COATED ORAL 2 TIMES DAILY
Qty: 180 TABLET | Refills: 1 | Status: SHIPPED | OUTPATIENT
Start: 2023-04-04 | End: 2023-10-01

## 2023-04-04 SDOH — ECONOMIC STABILITY: FOOD INSECURITY: WITHIN THE PAST 12 MONTHS, THE FOOD YOU BOUGHT JUST DIDN'T LAST AND YOU DIDN'T HAVE MONEY TO GET MORE.: NEVER TRUE

## 2023-04-04 SDOH — ECONOMIC STABILITY: FOOD INSECURITY: WITHIN THE PAST 12 MONTHS, YOU WORRIED THAT YOUR FOOD WOULD RUN OUT BEFORE YOU GOT MONEY TO BUY MORE.: NEVER TRUE

## 2023-04-04 SDOH — ECONOMIC STABILITY: INCOME INSECURITY: HOW HARD IS IT FOR YOU TO PAY FOR THE VERY BASICS LIKE FOOD, HOUSING, MEDICAL CARE, AND HEATING?: NOT HARD AT ALL

## 2023-04-04 SDOH — ECONOMIC STABILITY: HOUSING INSECURITY
IN THE LAST 12 MONTHS, WAS THERE A TIME WHEN YOU DID NOT HAVE A STEADY PLACE TO SLEEP OR SLEPT IN A SHELTER (INCLUDING NOW)?: NO

## 2023-04-04 ASSESSMENT — ENCOUNTER SYMPTOMS
VOMITING: 0
DIARRHEA: 0
APNEA: 0
ABDOMINAL PAIN: 0
CHOKING: 0
SHORTNESS OF BREATH: 0
COUGH: 0
WHEEZING: 0
BLOOD IN STOOL: 0
CONSTIPATION: 1

## 2023-04-04 NOTE — PROGRESS NOTES
daily 180 tablet 1    omeprazole (PRILOSEC) 40 MG delayed release capsule Take 1 capsule by mouth daily 90 capsule 1    PARoxetine (PAXIL) 10 MG tablet Take 1 tablet by mouth daily 90 tablet 1    propranolol (INDERAL LA) 60 MG extended release capsule Take 1 capsule by mouth daily 90 capsule 1    rosuvastatin (CRESTOR) 10 MG tablet Take 1 tablet by mouth daily 90 tablet 1    tiZANidine (ZANAFLEX) 4 MG tablet TAKE 1 TABLET BY MOUTH TWICE DAILY PRN spasm 180 tablet 0    vitamin D (ERGOCALCIFEROL) 1.25 MG (57163 UT) CAPS capsule Take 1 capsule by mouth once a week 12 capsule 0    Handicap Placard MISC UNTIL 2/11/2026 1 each 0       I have reviewed all pertinent PMHx, PSHx, FamHx, SocialHx, medications, and allergies and updated history as appropriate. OBJECTIVE    VS: /80   Pulse 68   Temp 97.2 °F (36.2 °C) (Temporal)   Resp 18   Ht 5' 5\" (1.651 m)   Wt 187 lb (84.8 kg)   LMP  (LMP Unknown)   SpO2 96%   BMI 31.12 kg/m²   Physical Exam  Constitutional:       General: She is not in acute distress. Appearance: She is well-developed. She is not diaphoretic. Comments: Ambulating with a cane. HENT:      Head: Normocephalic and atraumatic. Eyes:      Conjunctiva/sclera: Conjunctivae normal.      Pupils: Pupils are equal, round, and reactive to light. Cardiovascular:      Rate and Rhythm: Normal rate and regular rhythm. Pulmonary:      Effort: Pulmonary effort is normal.      Breath sounds: Normal breath sounds. Abdominal:      General: Bowel sounds are normal. There is no distension. Palpations: Abdomen is soft. Tenderness: There is no abdominal tenderness. Hernia: No hernia is present. Musculoskeletal:      Cervical back: Normal range of motion and neck supple. Skin:     General: Skin is warm and dry. Neurological:      Mental Status: She is alert and oriented to person, place, and time. Motor: No weakness. ASSESSMENT/PLAN:  1.  Mood disorder [facility])  Patient

## 2023-06-01 ENCOUNTER — OFFICE VISIT (OUTPATIENT)
Dept: FAMILY MEDICINE CLINIC | Age: 78
End: 2023-06-01
Payer: MEDICARE

## 2023-06-01 VITALS
TEMPERATURE: 97.2 F | WEIGHT: 184 LBS | BODY MASS INDEX: 30.66 KG/M2 | DIASTOLIC BLOOD PRESSURE: 86 MMHG | HEIGHT: 65 IN | HEART RATE: 72 BPM | OXYGEN SATURATION: 97 % | SYSTOLIC BLOOD PRESSURE: 134 MMHG | RESPIRATION RATE: 18 BRPM

## 2023-06-01 DIAGNOSIS — E78.2 MIXED HYPERLIPIDEMIA: ICD-10-CM

## 2023-06-01 DIAGNOSIS — R63.4 WEIGHT LOSS: ICD-10-CM

## 2023-06-01 DIAGNOSIS — E78.2 MIXED HYPERLIPIDEMIA: Primary | ICD-10-CM

## 2023-06-01 DIAGNOSIS — K59.00 CONSTIPATION, UNSPECIFIED CONSTIPATION TYPE: ICD-10-CM

## 2023-06-01 DIAGNOSIS — I10 ESSENTIAL HYPERTENSION: ICD-10-CM

## 2023-06-01 DIAGNOSIS — E55.9 VITAMIN D DEFICIENCY: ICD-10-CM

## 2023-06-01 PROCEDURE — 1123F ACP DISCUSS/DSCN MKR DOCD: CPT | Performed by: FAMILY MEDICINE

## 2023-06-01 PROCEDURE — 99214 OFFICE O/P EST MOD 30 MIN: CPT | Performed by: FAMILY MEDICINE

## 2023-06-01 PROCEDURE — G8417 CALC BMI ABV UP PARAM F/U: HCPCS | Performed by: FAMILY MEDICINE

## 2023-06-01 PROCEDURE — G8427 DOCREV CUR MEDS BY ELIG CLIN: HCPCS | Performed by: FAMILY MEDICINE

## 2023-06-01 PROCEDURE — 1090F PRES/ABSN URINE INCON ASSESS: CPT | Performed by: FAMILY MEDICINE

## 2023-06-01 PROCEDURE — 3074F SYST BP LT 130 MM HG: CPT | Performed by: FAMILY MEDICINE

## 2023-06-01 PROCEDURE — 3078F DIAST BP <80 MM HG: CPT | Performed by: FAMILY MEDICINE

## 2023-06-01 PROCEDURE — G8400 PT W/DXA NO RESULTS DOC: HCPCS | Performed by: FAMILY MEDICINE

## 2023-06-01 PROCEDURE — 1036F TOBACCO NON-USER: CPT | Performed by: FAMILY MEDICINE

## 2023-06-01 RX ORDER — DOCUSATE SODIUM 100 MG/1
100 CAPSULE, LIQUID FILLED ORAL 2 TIMES DAILY
Qty: 60 CAPSULE | Refills: 1 | Status: SHIPPED | OUTPATIENT
Start: 2023-06-01 | End: 2023-07-01

## 2023-06-01 RX ORDER — ROSUVASTATIN CALCIUM 20 MG/1
20 TABLET, COATED ORAL NIGHTLY
Qty: 30 TABLET | Refills: 1 | Status: SHIPPED | OUTPATIENT
Start: 2023-06-01

## 2023-06-01 RX ORDER — ROSUVASTATIN CALCIUM 20 MG/1
TABLET, COATED ORAL
Qty: 90 TABLET | OUTPATIENT
Start: 2023-06-01

## 2023-06-01 ASSESSMENT — ENCOUNTER SYMPTOMS
SHORTNESS OF BREATH: 0
ABDOMINAL PAIN: 0
WHEEZING: 0
COUGH: 0
NAUSEA: 0
DIARRHEA: 0
CONSTIPATION: 0
VOMITING: 0

## 2023-06-07 PROBLEM — R63.4 WEIGHT LOSS: Status: ACTIVE | Noted: 2023-06-07

## 2023-07-10 DIAGNOSIS — G89.29 CHRONIC NECK PAIN: ICD-10-CM

## 2023-07-10 DIAGNOSIS — M54.2 CHRONIC NECK PAIN: ICD-10-CM

## 2023-07-10 NOTE — TELEPHONE ENCOUNTER
Last Appointment:  6/1/2023  Future Appointments   Date Time Provider [address]   10/3/2023 12:45 PM Dang Arthur MD [facility]

## 2023-07-11 RX ORDER — TIZANIDINE 4 MG/1
TABLET ORAL
Qty: 180 TABLET | Refills: 0 | Status: SHIPPED | OUTPATIENT
Start: 2023-07-11

## 2023-09-28 DIAGNOSIS — F39 MOOD DISORDER (HCC): ICD-10-CM

## 2023-09-28 NOTE — TELEPHONE ENCOUNTER
Last Appointment:  6/1/2023  Future Appointments   Date Time Provider [address]   10/3/2023 12:45 PM Micheal Puri MD [facility]

## 2023-09-29 RX ORDER — PAROXETINE 10 MG/1
10 TABLET, FILM COATED ORAL DAILY
Qty: 90 TABLET | Refills: 0 | Status: SHIPPED
Start: 2023-09-29 | End: 2023-10-03 | Stop reason: SDUPTHER

## 2023-10-03 ENCOUNTER — OFFICE VISIT (OUTPATIENT)
Dept: FAMILY MEDICINE CLINIC | Age: 78
End: 2023-10-03
Payer: MEDICARE

## 2023-10-03 VITALS
TEMPERATURE: 97.5 F | OXYGEN SATURATION: 93 % | BODY MASS INDEX: 30.39 KG/M2 | RESPIRATION RATE: 20 BRPM | DIASTOLIC BLOOD PRESSURE: 84 MMHG | SYSTOLIC BLOOD PRESSURE: 122 MMHG | WEIGHT: 182.4 LBS | HEART RATE: 56 BPM | HEIGHT: 65 IN

## 2023-10-03 DIAGNOSIS — E78.2 MIXED HYPERLIPIDEMIA: ICD-10-CM

## 2023-10-03 DIAGNOSIS — Z78.0 POSTMENOPAUSAL: ICD-10-CM
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
## 2023-10-03 DIAGNOSIS — K21.9 GASTROESOPHAGEAL REFLUX DISEASE WITHOUT ESOPHAGITIS: ICD-10-CM

## 2023-10-03 DIAGNOSIS — M50.30 DDD (DEGENERATIVE DISC DISEASE), CERVICAL: ICD-10-CM

## 2023-10-03 DIAGNOSIS — F39 MOOD DISORDER (HCC): ICD-10-CM

## 2023-10-03 DIAGNOSIS — Z00.00 MEDICARE ANNUAL WELLNESS VISIT, SUBSEQUENT: Primary | ICD-10-CM

## 2023-10-03 DIAGNOSIS — I10 ESSENTIAL HYPERTENSION: ICD-10-CM

## 2023-10-03 DIAGNOSIS — Z23 FLU VACCINE NEED: ICD-10-CM

## 2023-10-03 DIAGNOSIS — E55.9 VITAMIN D DEFICIENCY: ICD-10-CM

## 2023-10-03 DIAGNOSIS — J44.9 CHRONIC OBSTRUCTIVE PULMONARY DISEASE, UNSPECIFIED COPD TYPE (HCC): ICD-10-CM

## 2023-10-03 DIAGNOSIS — M17.0 PRIMARY OSTEOARTHRITIS OF BOTH KNEES: ICD-10-CM

## 2023-10-03 DIAGNOSIS — M19.012 LOCALIZED OSTEOARTHRITIS OF LEFT SHOULDER: ICD-10-CM

## 2023-10-03 LAB
ABSOLUTE IMMATURE GRANULOCYTE: <0.03 K/UL (ref 0–0.58)
ALBUMIN SERPL-MCNC: 4.4 G/DL (ref 3.5–5.2)
ALP BLD-CCNC: 99 U/L (ref 35–104)
ALT SERPL-CCNC: 14 U/L (ref 0–32)
ANION GAP SERPL CALCULATED.3IONS-SCNC: 14 MMOL/L (ref 7–16)
AST SERPL-CCNC: 15 U/L (ref 0–31)
BASOPHILS ABSOLUTE: 0.05 K/UL (ref 0–0.2)
BASOPHILS RELATIVE PERCENT: 1 % (ref 0–2)
BILIRUB SERPL-MCNC: 0.4 MG/DL (ref 0–1.2)
BUN BLDV-MCNC: 19 MG/DL (ref 6–23)
CALCIUM SERPL-MCNC: 9.5 MG/DL (ref 8.6–10.2)
CHLORIDE BLD-SCNC: 101 MMOL/L (ref 98–107)
CHOLESTEROL: 219 MG/DL
CO2: 26 MMOL/L (ref 22–29)
CREAT SERPL-MCNC: 1.1 MG/DL (ref 0.5–1)
EOSINOPHILS ABSOLUTE: 0.14 K/UL (ref 0.05–0.5)
EOSINOPHILS RELATIVE PERCENT: 2 % (ref 0–6)
GFR SERPL CREATININE-BSD FRML MDRD: 50 ML/MIN/1.73M2
GLUCOSE BLD-MCNC: 113 MG/DL (ref 74–99)
HCT VFR BLD CALC: 46.7 % (ref 34–48)
HDLC SERPL-MCNC: 79 MG/DL
HEMOGLOBIN: 14.6 G/DL (ref 11.5–15.5)
IMMATURE GRANULOCYTES: 0 % (ref 0–5)
LDL CHOLESTEROL: 119 MG/DL
LYMPHOCYTES ABSOLUTE: 2.38 K/UL (ref 1.5–4)
LYMPHOCYTES RELATIVE PERCENT: 32 % (ref 20–42)
MCH RBC QN AUTO: 30.4 PG (ref 26–35)
MCHC RBC AUTO-ENTMCNC: 31.3 G/DL (ref 32–34.5)
MCV RBC AUTO: 97.3 FL (ref 80–99.9)
MONOCYTES ABSOLUTE: 0.57 K/UL (ref 0.1–0.95)
MONOCYTES RELATIVE PERCENT: 8 % (ref 2–12)
NEUTROPHILS ABSOLUTE: 4.2 K/UL (ref 1.8–7.3)
NEUTROPHILS RELATIVE PERCENT: 57 % (ref 43–80)
PDW BLD-RTO: 13.8 % (ref 11.5–15)
PLATELET # BLD: 287 K/UL (ref 130–450)
PMV BLD AUTO: 10.4 FL (ref 7–12)
POTASSIUM SERPL-SCNC: 4.2 MMOL/L (ref 3.5–5)
RBC # BLD: 4.8 M/UL (ref 3.5–5.5)
SODIUM BLD-SCNC: 141 MMOL/L (ref 132–146)
TOTAL PROTEIN: 7.2 G/DL (ref 6.4–8.3)
TRIGL SERPL-MCNC: 103 MG/DL
URIC ACID: 7.4 MG/DL (ref 2.4–5.7)
VITAMIN D 25-HYDROXY: 52.6 NG/ML (ref 30–100)
VLDLC SERPL CALC-MCNC: 21 MG/DL
WBC # BLD: 7.4 K/UL (ref 4.5–11.5)

## 2023-10-03 PROCEDURE — 36415 COLL VENOUS BLD VENIPUNCTURE: CPT | Performed by: FAMILY MEDICINE

## 2023-10-03 PROCEDURE — 99214 OFFICE O/P EST MOD 30 MIN: CPT | Performed by: FAMILY MEDICINE

## 2023-10-03 PROCEDURE — 90471 IMMUNIZATION ADMIN: CPT | Performed by: FAMILY MEDICINE

## 2023-10-03 PROCEDURE — 1123F ACP DISCUSS/DSCN MKR DOCD: CPT | Performed by: FAMILY MEDICINE

## 2023-10-03 PROCEDURE — G0008 ADMIN INFLUENZA VIRUS VAC: HCPCS | Performed by: FAMILY MEDICINE

## 2023-10-03 PROCEDURE — 3078F DIAST BP <80 MM HG: CPT | Performed by: FAMILY MEDICINE

## 2023-10-03 PROCEDURE — G8484 FLU IMMUNIZE NO ADMIN: HCPCS | Performed by: FAMILY MEDICINE

## 2023-10-03 PROCEDURE — 90694 VACC AIIV4 NO PRSRV 0.5ML IM: CPT | Performed by: FAMILY MEDICINE

## 2023-10-03 PROCEDURE — G0439 PPPS, SUBSEQ VISIT: HCPCS | Performed by: FAMILY MEDICINE

## 2023-10-03 PROCEDURE — 3074F SYST BP LT 130 MM HG: CPT | Performed by: FAMILY MEDICINE

## 2023-10-03 RX ORDER — FAMOTIDINE 20 MG/1
20 TABLET, FILM COATED ORAL 2 TIMES DAILY
Qty: 180 TABLET | Refills: 1 | Status: SHIPPED | OUTPATIENT
Start: 2023-10-03 | End: 2024-03-31

## 2023-10-03 RX ORDER — ROSUVASTATIN CALCIUM 20 MG/1
20 TABLET, COATED ORAL NIGHTLY
Qty: 90 TABLET | Refills: 1 | Status: SHIPPED | OUTPATIENT
Start: 2023-10-03

## 2023-10-03 RX ORDER — TIZANIDINE 4 MG/1
TABLET ORAL
Qty: 180 TABLET | Refills: 0 | Status: SHIPPED | OUTPATIENT
Start: 2023-10-03

## 2023-10-03 RX ORDER — PROPRANOLOL HCL 60 MG
60 CAPSULE, EXTENDED RELEASE 24HR ORAL DAILY
Qty: 90 CAPSULE | Refills: 1 | Status: SHIPPED | OUTPATIENT
Start: 2023-10-03 | End: 2024-03-31

## 2023-10-03 RX ORDER — CHOLECALCIFEROL (VITAMIN D3) 1250 MCG
CAPSULE ORAL
Qty: 6 CAPSULE | Refills: 0 | Status: CANCELLED | OUTPATIENT
Start: 2023-10-03

## 2023-10-03 RX ORDER — OMEPRAZOLE 20 MG/1
20 CAPSULE, DELAYED RELEASE ORAL DAILY
Qty: 90 CAPSULE | Refills: 1 | Status: SHIPPED | OUTPATIENT
Start: 2023-10-03 | End: 2024-03-31

## 2023-10-03 RX ORDER — BUPROPION HYDROCHLORIDE 300 MG/1
300 TABLET ORAL EVERY MORNING
Qty: 90 TABLET | Refills: 1 | Status: SHIPPED | OUTPATIENT
Start: 2023-10-03 | End: 2024-03-31

## 2023-10-03 RX ORDER — OMEPRAZOLE 40 MG/1
40 CAPSULE, DELAYED RELEASE ORAL DAILY
Qty: 90 CAPSULE | Refills: 1 | Status: SHIPPED
Start: 2023-10-03 | End: 2023-10-03 | Stop reason: SDUPTHER

## 2023-10-03 RX ORDER — AMLODIPINE BESYLATE 5 MG/1
5 TABLET ORAL DAILY
Qty: 90 TABLET | Refills: 1 | Status: SHIPPED | OUTPATIENT
Start: 2023-10-03 | End: 2024-03-31

## 2023-10-03 RX ORDER — PAROXETINE 10 MG/1
10 TABLET, FILM COATED ORAL DAILY
Qty: 90 TABLET | Refills: 1 | Status: SHIPPED | OUTPATIENT
Start: 2023-10-03 | End: 2024-03-31

## 2023-10-03 ASSESSMENT — PATIENT HEALTH QUESTIONNAIRE - PHQ9
SUM OF ALL RESPONSES TO PHQ QUESTIONS 1-9: 4
1. LITTLE INTEREST OR PLEASURE IN DOING THINGS: 1
6. FEELING BAD ABOUT YOURSELF - OR THAT YOU ARE A FAILURE OR HAVE LET YOURSELF OR YOUR FAMILY DOWN: 0
SUM OF ALL RESPONSES TO PHQ9 QUESTIONS 1 & 2: 2
8. MOVING OR SPEAKING SO SLOWLY THAT OTHER PEOPLE COULD HAVE NOTICED. OR THE OPPOSITE, BEING SO FIGETY OR RESTLESS THAT YOU HAVE BEEN MOVING AROUND A LOT MORE THAN USUAL: 0
3. TROUBLE FALLING OR STAYING ASLEEP: 1
SUM OF ALL RESPONSES TO PHQ QUESTIONS 1-9: 4
10. IF YOU CHECKED OFF ANY PROBLEMS, HOW DIFFICULT HAVE THESE PROBLEMS MADE IT FOR YOU TO DO YOUR WORK, TAKE CARE OF THINGS AT HOME, OR GET ALONG WITH OTHER PEOPLE: 1
2. FEELING DOWN, DEPRESSED OR HOPELESS: 1
5. POOR APPETITE OR OVEREATING: 0
7. TROUBLE CONCENTRATING ON THINGS, SUCH AS READING THE NEWSPAPER OR WATCHING TELEVISION: 0
SUM OF ALL RESPONSES TO PHQ QUESTIONS 1-9: 4
4. FEELING TIRED OR HAVING LITTLE ENERGY: 1
SUM OF ALL RESPONSES TO PHQ QUESTIONS 1-9: 4
9. THOUGHTS THAT YOU WOULD BE BETTER OFF DEAD, OR OF HURTING YOURSELF: 0

## 2023-10-03 ASSESSMENT — LIFESTYLE VARIABLES
HOW OFTEN DO YOU HAVE A DRINK CONTAINING ALCOHOL: NEVER
HOW MANY STANDARD DRINKS CONTAINING ALCOHOL DO YOU HAVE ON A TYPICAL DAY: PATIENT DOES NOT DRINK

## 2023-10-03 NOTE — PROGRESS NOTES
Medicare Annual Wellness Visit    Felicity Orozco is here for Medicare AWV (Pt here for AWV. Pt states she is going to stop driving and needs to know how she can get to appts now.) and Other (Pt needed refills and requesting the cetirizine that she used to take be given again D/T allergies.)    Assessment & Plan   Medicare annual wellness visit, subsequent  Survey and care gaps reviewed. Primary osteoarthritis of both knees  Recommend walker to help decrease falls. Consider PT or consult. -     DME Order for [facility]) as OP  Localized osteoarthritis of left shoulder  DDD (degenerative disc disease), cervical  -     tiZANidine (ZANAFLEX) 4 MG tablet; TAKE 1 TABLET BY MOUTH TWICE DAILY AS NEEDED FOR SPASM, Disp-180 tablet, R-0Normal  -     DME Order for (Specify) as OP  Fall, initial encounter  Essential hypertension  Stable. Continue current regimen. -     amLODIPine (NORVASC) 5 MG tablet; Take 1 tablet by mouth          daily, Disp-90 tablet, R-1Normal  -     propranolol (INDERAL LA) 60 MG extended release capsule; Take 1 capsule by mouth daily, Disp-90 capsule, R-1Normal  -     Comprehensive Metabolic Panel; Future  -     Uric Acid; Future  -     CBC with Auto Differential; Future  -     Basic Metabolic Panel; Future  Mixed hyperlipidemia  -     rosuvastatin (CRESTOR) 20 MG tablet; Take 1 tablet by mouth nightly, Disp-90 tablet, R-1Normal  -     Lipid Panel; Future  Mood disorder (HCC)  -     buPROPion (WELLBUTRIN XL) 300 MG extended release tablet; Take 1 tablet by mouth every morning, Disp-90 tablet, R-1Normal  -     PARoxetine (PAXIL) 10 MG tablet; Take 1 tablet by mouth daily, Disp-90 tablet, R-1Normal  Vitamin D deficiency  -     Vitamin D 25 Hydroxy; Future  Gastroesophageal reflux disease without esophagitis  -     famotidine (PEPCID) 20 MG tablet;  Take 1 tablet by mouth 2 times daily, Disp-180 tablet, R-1**Patient requests 90 days supply**Normal  -     omeprazole (PRILOSEC) 20 MG delayed release

## 2023-10-05 DIAGNOSIS — R73.03 PREDIABETES: ICD-10-CM

## 2023-10-05 DIAGNOSIS — F39 MOOD DISORDER (HCC): ICD-10-CM

## 2023-10-05 DIAGNOSIS — I10 ESSENTIAL HYPERTENSION: ICD-10-CM

## 2023-10-05 RX ORDER — PROPRANOLOL HCL 60 MG
60 CAPSULE, EXTENDED RELEASE 24HR ORAL DAILY
Qty: 90 CAPSULE | Refills: 1 | OUTPATIENT
Start: 2023-10-05 | End: 2024-04-02

## 2023-10-05 RX ORDER — BUPROPION HYDROCHLORIDE 300 MG/1
300 TABLET ORAL EVERY MORNING
Qty: 90 TABLET | Refills: 1 | OUTPATIENT
Start: 2023-10-05 | End: 2024-04-02

## 2023-10-05 RX ORDER — ROSUVASTATIN CALCIUM 10 MG/1
10 TABLET, COATED ORAL DAILY
Qty: 90 TABLET | Refills: 1 | OUTPATIENT
Start: 2023-10-05 | End: 2024-04-02

## 2023-12-30 DIAGNOSIS — M50.30 DDD (DEGENERATIVE DISC DISEASE), CERVICAL: ICD-10-CM

## 2024-01-02 RX ORDER — TIZANIDINE 4 MG/1
TABLET ORAL
Qty: 180 TABLET | Refills: 0 | Status: SHIPPED | OUTPATIENT
Start: 2024-01-02

## 2024-01-02 NOTE — TELEPHONE ENCOUNTER
Last Appointment:  10/3/2023  Future Appointments   Date Time Provider Department Center   10/8/2024 12:45 PM Kimberly Sewell MD [facility]

## 2024-02-15 ENCOUNTER — OFFICE VISIT (OUTPATIENT)
Dept: FAMILY MEDICINE CLINIC | Age: 79
End: 2024-02-15

## 2024-02-15 VITALS
TEMPERATURE: 97.8 F | SYSTOLIC BLOOD PRESSURE: 126 MMHG | OXYGEN SATURATION: 93 % | DIASTOLIC BLOOD PRESSURE: 82 MMHG | HEIGHT: 65 IN | RESPIRATION RATE: 20 BRPM | WEIGHT: 181.2 LBS | BODY MASS INDEX: 30.19 KG/M2 | HEART RATE: 68 BPM

## 2024-02-15 DIAGNOSIS — M50.30 DDD (DEGENERATIVE DISC DISEASE), CERVICAL: ICD-10-CM

## 2024-02-15 DIAGNOSIS — F51.5 NIGHTMARES: ICD-10-CM

## 2024-02-15 DIAGNOSIS — F39 MOOD DISORDER (HCC): ICD-10-CM

## 2024-02-15 DIAGNOSIS — I10 ESSENTIAL HYPERTENSION: ICD-10-CM

## 2024-02-15 DIAGNOSIS — J44.9 CHRONIC OBSTRUCTIVE PULMONARY DISEASE, UNSPECIFIED COPD TYPE (HCC): ICD-10-CM

## 2024-02-15 DIAGNOSIS — E78.2 MIXED HYPERLIPIDEMIA: ICD-10-CM

## 2024-02-15 DIAGNOSIS — R73.09 ELEVATED GLUCOSE: ICD-10-CM

## 2024-02-15 DIAGNOSIS — K21.9 GASTROESOPHAGEAL REFLUX DISEASE WITHOUT ESOPHAGITIS: ICD-10-CM

## 2024-02-15 DIAGNOSIS — F32.1 CURRENT MODERATE EPISODE OF MAJOR DEPRESSIVE DISORDER, UNSPECIFIED WHETHER RECURRENT (HCC): ICD-10-CM

## 2024-02-15 DIAGNOSIS — E79.0 ELEVATED URIC ACID IN BLOOD: ICD-10-CM

## 2024-02-15 DIAGNOSIS — R91.1 PULMONARY NODULE: Primary | ICD-10-CM

## 2024-02-15 DIAGNOSIS — E55.9 VITAMIN D DEFICIENCY: ICD-10-CM

## 2024-02-15 RX ORDER — FAMOTIDINE 20 MG/1
20 TABLET, FILM COATED ORAL 2 TIMES DAILY
Qty: 180 TABLET | Refills: 1 | Status: SHIPPED | OUTPATIENT
Start: 2024-02-15 | End: 2024-08-13

## 2024-02-15 RX ORDER — AMLODIPINE BESYLATE 5 MG/1
5 TABLET ORAL DAILY
Qty: 90 TABLET | Refills: 1 | Status: SHIPPED | OUTPATIENT
Start: 2024-02-15 | End: 2024-08-13

## 2024-02-15 RX ORDER — TIZANIDINE 4 MG/1
TABLET ORAL
Qty: 180 TABLET | Refills: 1 | Status: SHIPPED | OUTPATIENT
Start: 2024-02-15

## 2024-02-15 RX ORDER — PAROXETINE 10 MG/1
10 TABLET, FILM COATED ORAL DAILY
Qty: 90 TABLET | Refills: 1 | Status: SHIPPED | OUTPATIENT
Start: 2024-02-15 | End: 2024-08-13

## 2024-02-15 RX ORDER — ROSUVASTATIN CALCIUM 20 MG/1
20 TABLET, COATED ORAL NIGHTLY
Qty: 90 TABLET | Refills: 1 | Status: SHIPPED | OUTPATIENT
Start: 2024-02-15

## 2024-02-15 RX ORDER — OMEPRAZOLE 20 MG/1
20 CAPSULE, DELAYED RELEASE ORAL DAILY
Qty: 90 CAPSULE | Refills: 1 | Status: SHIPPED | OUTPATIENT
Start: 2024-02-15 | End: 2024-08-13

## 2024-02-15 RX ORDER — PROPRANOLOL HCL 60 MG
60 CAPSULE, EXTENDED RELEASE 24HR ORAL DAILY
Qty: 90 CAPSULE | Refills: 1 | Status: SHIPPED | OUTPATIENT
Start: 2024-02-15 | End: 2024-08-13

## 2024-02-15 RX ORDER — CHOLECALCIFEROL (VITAMIN D3) 1250 MCG
CAPSULE ORAL
Qty: 6 CAPSULE | Refills: 0 | Status: CANCELLED | OUTPATIENT
Start: 2024-02-15

## 2024-02-15 ASSESSMENT — PATIENT HEALTH QUESTIONNAIRE - PHQ9
7. TROUBLE CONCENTRATING ON THINGS, SUCH AS READING THE NEWSPAPER OR WATCHING TELEVISION: 3
8. MOVING OR SPEAKING SO SLOWLY THAT OTHER PEOPLE COULD HAVE NOTICED. OR THE OPPOSITE, BEING SO FIGETY OR RESTLESS THAT YOU HAVE BEEN MOVING AROUND A LOT MORE THAN USUAL: 0
10. IF YOU CHECKED OFF ANY PROBLEMS, HOW DIFFICULT HAVE THESE PROBLEMS MADE IT FOR YOU TO DO YOUR WORK, TAKE CARE OF THINGS AT HOME, OR GET ALONG WITH OTHER PEOPLE: 2
SUM OF ALL RESPONSES TO PHQ QUESTIONS 1-9: 18
4. FEELING TIRED OR HAVING LITTLE ENERGY: 3
1. LITTLE INTEREST OR PLEASURE IN DOING THINGS: 3
SUM OF ALL RESPONSES TO PHQ QUESTIONS 1-9: 18
9. THOUGHTS THAT YOU WOULD BE BETTER OFF DEAD, OR OF HURTING YOURSELF: 0
SUM OF ALL RESPONSES TO PHQ9 QUESTIONS 1 & 2: 6
3. TROUBLE FALLING OR STAYING ASLEEP: 3
2. FEELING DOWN, DEPRESSED OR HOPELESS: 3
6. FEELING BAD ABOUT YOURSELF - OR THAT YOU ARE A FAILURE OR HAVE LET YOURSELF OR YOUR FAMILY DOWN: 3
5. POOR APPETITE OR OVEREATING: 0
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
## 2024-02-16 ENCOUNTER — TELEPHONE (OUTPATIENT)
Dept: FAMILY MEDICINE CLINIC | Age: 79
End: 2024-02-16

## 2024-02-16 NOTE — TELEPHONE ENCOUNTER
Pt called and states she went to get her medication from the pharmacy and when she got to the pharmacy they said they had the tizanidine, pt states she talked with you about changing the medication and would like to know if she can get the change or if she was supposed to continue taking this medication. Please advise.     Electronically signed by ANIL SOMERS MA on 2/16/24 at 3:30 PM EST

## 2024-02-19 RX ORDER — BACLOFEN 10 MG/1
10 TABLET ORAL 3 TIMES DAILY PRN
Qty: 90 TABLET | Refills: 0 | Status: SHIPPED | OUTPATIENT
Start: 2024-02-19

## 2024-02-20 ENCOUNTER — HOSPITAL ENCOUNTER (EMERGENCY)
Age: 79
Discharge: HOME | End: 2024-02-20
Attending: EMERGENCY MEDICINE
Payer: MEDICARE

## 2024-02-20 ENCOUNTER — APPOINTMENT (OUTPATIENT)
Dept: GENERAL RADIOLOGY | Age: 79
End: 2024-02-20
Payer: MEDICARE

## 2024-02-20 VITALS
TEMPERATURE: 97.8 F | BODY MASS INDEX: 30.16 KG/M2 | HEIGHT: 65 IN | OXYGEN SATURATION: 98 % | WEIGHT: 181 LBS | SYSTOLIC BLOOD PRESSURE: 162 MMHG | DIASTOLIC BLOOD PRESSURE: 78 MMHG | RESPIRATION RATE: 17 BRPM | HEART RATE: 63 BPM

## 2024-02-20 DIAGNOSIS — S53.124A CLOSED POSTERIOR DISLOCATION OF RIGHT ELBOW, INITIAL ENCOUNTER: Primary | ICD-10-CM

## 2024-02-20 PROCEDURE — 99285 EMERGENCY DEPT VISIT HI MDM: CPT

## 2024-02-20 PROCEDURE — 73110 X-RAY EXAM OF WRIST: CPT

## 2024-02-20 PROCEDURE — 73070 X-RAY EXAM OF ELBOW: CPT

## 2024-02-20 PROCEDURE — 6360000002 HC RX W HCPCS: Performed by: EMERGENCY MEDICINE

## 2024-02-20 PROCEDURE — 73030 X-RAY EXAM OF SHOULDER: CPT

## 2024-02-20 PROCEDURE — 24600 TX CLSD ELBOW DISLC W/O ANES: CPT

## 2024-02-20 PROCEDURE — 96374 THER/PROPH/DIAG INJ IV PUSH: CPT

## 2024-02-20 RX ORDER — MORPHINE SULFATE 5 MG/ML
5 INJECTION, SOLUTION INTRAMUSCULAR; INTRAVENOUS ONCE
Status: COMPLETED | OUTPATIENT
Start: 2024-02-20 | End: 2024-02-20

## 2024-02-20 RX ORDER — PROPOFOL 10 MG/ML
1 INJECTION, EMULSION INTRAVENOUS ONCE
Status: COMPLETED | OUTPATIENT
Start: 2024-02-20 | End: 2024-02-20

## 2024-02-20 RX ORDER — SODIUM CHLORIDE 0.9 % (FLUSH) 0.9 %
SYRINGE (ML) INJECTION
Status: DISCONTINUED
Start: 2024-02-20 | End: 2024-02-20 | Stop reason: HOSPADM

## 2024-02-20 RX ORDER — HYDROCODONE BITARTRATE AND ACETAMINOPHEN 5; 325 MG/1; MG/1
1 TABLET ORAL 3 TIMES DAILY PRN
Qty: 18 TABLET | Refills: 0 | Status: SHIPPED | OUTPATIENT
Start: 2024-02-20 | End: 2024-02-26

## 2024-02-20 RX ORDER — MORPHINE SULFATE 5 MG/ML
5 INJECTION, SOLUTION INTRAMUSCULAR; INTRAVENOUS ONCE
Status: DISCONTINUED | OUTPATIENT
Start: 2024-02-20 | End: 2024-02-20

## 2024-02-20 RX ADMIN — MORPHINE SULFATE 5 MG: 5 INJECTION, SOLUTION INTRAMUSCULAR; INTRAVENOUS at 11:30

## 2024-02-20 RX ADMIN — PROPOFOL 82 MG: 10 INJECTION, EMULSION INTRAVENOUS at 14:17

## 2024-02-20 ASSESSMENT — PAIN DESCRIPTION - LOCATION: LOCATION: ARM

## 2024-02-20 ASSESSMENT — PAIN SCALES - GENERAL
PAINLEVEL_OUTOF10: 10
PAINLEVEL_OUTOF10: 10

## 2024-02-20 ASSESSMENT — PAIN - FUNCTIONAL ASSESSMENT: PAIN_FUNCTIONAL_ASSESSMENT: 0-10

## 2024-02-20 ASSESSMENT — PAIN DESCRIPTION - ORIENTATION: ORIENTATION: RIGHT

## 2024-02-20 NOTE — CARE COORDINATION
SS Note: Pt here for right elbow posterior lateral dislocation. Ortho completed Reduction maneuvers & no acute orthopedic surgical intervention at this time. Pt to follow-up outpatient basis. pt ready for d/c- needs ride home. Pt tells ANITA she lives @ [address] and drives. She also uses walker and cane and states she just needs ride to her car to get her cane. Pt has insurance & [employer] charge noted she called [facility] for ride and was informed pt not active. SW called [facility] and was able to schedule trip. SW updated [employer] charge and pt needs to go to ED lobby asap. Waiting for sling for pt. SW left noodls voucher [employer] in [place].     [address] was here and picked pt up.  Electronically signed by IRWIN Thurman on 2/20/2024 at 4:42 PM

## 2024-02-20 NOTE — ED PROVIDER NOTES
[facility] EMERGENCY DEPARTMENT  EMERGENCY DEPARTMENT ENCOUNTER        Pt Name: Becki Figueroa  MRN: [id number]  Birthdate 1945  Date of evaluation: 2/20/2024  Provider: Jimmy Padilla DO  PCP: Kimberly Sewell MD  Note Started: 11:34 AM EST 2/20/24    CHIEF COMPLAINT       Chief Complaint   Patient presents with    Fall     R arm pain and deformity. No LOC, head injury or thinners       HISTORY OF PRESENT ILLNESS: 1 or more Elements   History From: patient and EMS crew    Limitations to history : None    Becki Figueroa is a 78 y.o. female who presents to the ED for evaluation of a fall.  Patient states that she was walking in the driveway with her walker.  Her walker gave out and she twisted landing on her right upper extremity.  Did not hit her head or lose consciousness.  Is not on anticoagulants.  He is only complaining of pain in her right upper extremity.  There was no dizziness, chest pain, or shortness of breath preceding the fall.  Denies any numbness or weakness in the affected extremity.  Unable to move it secondary to pain.  Was given a total of 100 mcg of fentanyl IV by EMS which provided only mild relief.  Denies any neck or back pain.  Patient is right-hand dominant    Nursing Notes were all reviewed and agreed with or any disagreements were addressed in the HPI.      REVIEW OF EXTERNAL NOTE :         REVIEW OF SYSTEMS :           Positives and Pertinent negatives as per HPI.     SURGICAL HISTORY     Past Surgical History:   Procedure Laterality Date    BRAIN SURGERY  07/2010    Cerebellar Cavernous Angioma resection     BREAST CYST ASPIRATION      BREAST REDUCTION SURGERY Bilateral 1993    HAND SURGERY Left     trapezius removal     HYSTERECTOMY, TOTAL ABDOMINAL (CERVIX REMOVED)  1979    SALIVARY GLAND SURGERY Left 1992    TONSILLECTOMY         CURRENTMEDICATIONS       Discharge Medication List as of 2/20/2024  4:17 PM        CONTINUE these medications

## 2024-02-20 NOTE — CONSULTS
Department of Orthopedic Surgery  Resident Consult Note          Reason for Consult: Right arm pain    HISTORY OF PRESENT ILLNESS:       Patient is a 78 y.o. right-hand-dominant female who presents with right arm pain following a fall that occurred earlier today.  Patient states that they were out getting the mail and using their rollator when the rollator it started rolling away they went to reach for it consequently falling onto the right side.  Patient had immediate right elbow pain and deformity.  Patient denies hitting her head or loss of consciousness.  Denies numbness/tingling/paresthesias.  Denies any other orthopedic complaints at this time.    Patient is a community ambulator with the use of a rollator.  Patient denies any blood thinners.  Patient denies any previous orthopedic injuries however she does have complaints of chronic right knee pain.  Patient also has history of stroke with residual left-sided weakness.    Past Medical History:        Diagnosis Date    Angioma     brain    Anxiety     Ataxia     Cerebrovascular accident (CVA) (HCC)     Per patient at age 10-11    Cerebrovascular disease     Chronic back pain     GERD (gastroesophageal reflux disease)     Headache     Hyperlipidemia      Past Surgical History:        Procedure Laterality Date    BRAIN SURGERY  07/2010    Cerebellar Cavernous Angioma resection     BREAST CYST ASPIRATION      BREAST REDUCTION SURGERY Bilateral 1993    HAND SURGERY Left     trapezius removal     HYSTERECTOMY, TOTAL ABDOMINAL (CERVIX REMOVED)  1979    SALIVARY GLAND SURGERY Left 1992    TONSILLECTOMY       Current Medications:   Current Facility-Administered Medications: sodium chloride flush 0.9 % injection, , ,   Allergies:  Patient has no known allergies.    Social History:   TOBACCO:   reports that she has never smoked. She has never used smokeless tobacco.  ETOH:   reports no history of alcohol use.  DRUGS:   reports no history of drug use.  ACTIVITIES OF

## 2024-02-26 ENCOUNTER — TELEPHONE (OUTPATIENT)
Dept: FAMILY MEDICINE CLINIC | Age: 79
End: 2024-02-26

## 2024-02-26 NOTE — TELEPHONE ENCOUNTER
Patient contact Rosalba called is asking for patient to have orders for home health evail to be done patient is in need of help in the home and getting to appts, also requesting local ortho orders to be sent.

## 2024-02-27 ENCOUNTER — TELEMEDICINE (OUTPATIENT)
Dept: FAMILY MEDICINE CLINIC | Age: 79
End: 2024-02-27
Payer: MEDICARE

## 2024-02-27 DIAGNOSIS — M17.0 PRIMARY OSTEOARTHRITIS OF BOTH KNEES: ICD-10-CM
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
## 2024-02-27 DIAGNOSIS — Z59.82 TRANSPORTATION INSECURITY: ICD-10-CM

## 2024-02-27 DIAGNOSIS — Z09 HOSPITAL DISCHARGE FOLLOW-UP: ICD-10-CM

## 2024-02-27 DIAGNOSIS — I10 ESSENTIAL HYPERTENSION: ICD-10-CM

## 2024-02-27 DIAGNOSIS — S53.124D CLOSED POSTERIOR DISLOCATION OF RIGHT ELBOW, SUBSEQUENT ENCOUNTER: Primary | ICD-10-CM

## 2024-02-27 PROCEDURE — 1111F DSCHRG MED/CURRENT MED MERGE: CPT | Performed by: FAMILY MEDICINE

## 2024-02-27 PROCEDURE — 1123F ACP DISCUSS/DSCN MKR DOCD: CPT | Performed by: FAMILY MEDICINE

## 2024-02-27 PROCEDURE — 99214 OFFICE O/P EST MOD 30 MIN: CPT | Performed by: FAMILY MEDICINE

## 2024-02-27 SDOH — ECONOMIC STABILITY - TRANSPORTATION SECURITY: TRANSPORTATION INSECURITY: Z59.82

## 2024-02-27 NOTE — PROGRESS NOTES
caregiver was present when appropriate.   The patient was located at Home: [address] [address]  Provider was located at Facility (Appt Dept): [address] [place] [address]         Total time spent for this encounter: Not billed by time      [facility] Medicine Outpatient        SUBJECTIVE:  CC: had concerns including Follow-Up from Hospital and Referral - General (Needs local orthopedic referral for dislocated right elbow, she was sent to one in [place] but she cannot drive that far.//She would also like an order for a home health aide and evaluation to see what she could get assistance with).  HPI:  Becki Figueroa is a female 78 y.o. presented for a vv to follow up from the hospital. She had a fall 2/20 after chasing her walker down a hill. She forgot to lock it. She fell on her right arm and dislocated her elbow. Relocated in the ED and given a sling on discharge. States she took off her pajamas and wasn't able to get the sling back up. She is holding it up and across her chest.   Has no family near by.     Review of Systems   Constitutional:  Negative for appetite change, fatigue and fever.   Respiratory:  Negative for cough, shortness of breath and wheezing.    Cardiovascular:  Negative for chest pain and palpitations.   Gastrointestinal:  Negative for abdominal pain, constipation, diarrhea, nausea and vomiting.   Musculoskeletal:  Positive for arthralgias. Negative for myalgias.       Outpatient Medications Marked as Taking for the 2/27/24 encounter (Telemedicine) with Kimberly Sewell MD   Medication Sig Dispense Refill    baclofen (LIORESAL) 10 MG tablet Take 1 tablet by mouth 3 times daily as needed (muscle spasm) 90 tablet 0    amLODIPine (NORVASC) 5 MG tablet Take 1 tablet by mouth daily 90 tablet 1    famotidine (PEPCID) 20 MG tablet Take 1 tablet by mouth 2 times daily 180 tablet 1    omeprazole (PRILOSEC) 20 MG delayed release capsule Take 1

## 2024-02-28 ENCOUNTER — CARE COORDINATION (OUTPATIENT)
Dept: CARE COORDINATION | Age: 79
End: 2024-02-28

## 2024-02-28 ENCOUNTER — TELEPHONE (OUTPATIENT)
Dept: FAMILY MEDICINE CLINIC | Age: 79
End: 2024-02-28

## 2024-02-28 DIAGNOSIS — Z59.82 TRANSPORTATION INSECURITY: ICD-10-CM

## 2024-02-28 DIAGNOSIS — M13.0 ARTHRITIS OF MULTIPLE SITES: ICD-10-CM
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
## 2024-02-28 DIAGNOSIS — M13.89 ALLERGIC ARTHRITIS, MULTIPLE SITES: ICD-10-CM

## 2024-02-28 SDOH — ECONOMIC STABILITY - TRANSPORTATION SECURITY: TRANSPORTATION INSECURITY: Z59.82

## 2024-02-28 NOTE — TELEPHONE ENCOUNTER
[facility] called and states they spoke with the pt's daughter and they need a an aid to help in the home with daily activities due to the fracture, she does not need skilled nursing at this time. Wanted to call and let us know they will be closing the referral and the pt needs sent to somewhere that does non skilled aid's for assistance throughout the home, recommended direction home. Please advise if okay to place order.     Electronically signed by ANIL SOMERS MA on 2/28/24 at 3:30 PM EST

## 2024-02-28 NOTE — CARE COORDINATION
[facility] received a call from Vincent regarding the application for the transportation.  He verified the mailing address and spelling of Becki's name.   Vincent questioned how Becki will get the application completed.  [facility] was hoping that one of the [facility] staff could assist or if Samia was able to complete it and mail it in from her [place] of [place]).  Vincent states he has someone that works for [facility] [employer] that could go to Becki's home and help her complete the application.    Vincent states he will place a call to Samia tomorrow to see what she prefers as the best option for getting the application completed.         [facility] placed a call to Samia (evy) and processed all services that were done today with Becki, as well as Vincent calling her tomorrow.   Samia states she can complete the application and return it via email and will talk with Vincent about that when he calls her tomorrow.  Samia did requested a copy of Becki's DL to be emailed to her so she can include that with the application.  [facility] was able to locate and send this per request.         [facility] reviewed all calls and referrals that were made with Becki today so that Samia was up to date.  Reviewed [employer] and income for HUEY.  Samia states her aunt will not qualify for HUEY.   Processed private pay HHA's with Samia.    Samia is aware that the waiting list for [employer] can be very long for the HHA services.   Samia will make some calls to the private agency list she has to see about prices for HHA/bath aide.       [facility] will call Samia on a merged call when making the next outreach call to Becki.   Samia also states she will keep [facility] updated on anything that gets started, such as a private pay aide or help from [employer].    [facility] will follow as scheduled.

## 2024-02-29 ENCOUNTER — OFFICE VISIT (OUTPATIENT)
Dept: ORTHOPEDIC SURGERY | Age: 79
End: 2024-02-29
Payer: MEDICARE

## 2024-02-29 VITALS — HEIGHT: 65 IN | TEMPERATURE: 98 F | BODY MASS INDEX: 30.16 KG/M2 | WEIGHT: 181 LBS

## 2024-02-29 DIAGNOSIS — M25.521 RIGHT ELBOW PAIN: ICD-10-CM

## 2024-02-29 DIAGNOSIS — S53.124A CLOSED POSTERIOR DISLOCATION OF ELBOW, RIGHT, INITIAL ENCOUNTER: Primary | ICD-10-CM

## 2024-02-29 PROCEDURE — 1123F ACP DISCUSS/DSCN MKR DOCD: CPT | Performed by: ORTHOPAEDIC SURGERY

## 2024-02-29 PROCEDURE — 99204 OFFICE O/P NEW MOD 45 MIN: CPT | Performed by: ORTHOPAEDIC SURGERY

## 2024-02-29 RX ORDER — TRAMADOL HYDROCHLORIDE 50 MG/1
50 TABLET ORAL 3 TIMES DAILY
Qty: 21 TABLET | Refills: 0 | Status: SHIPPED | OUTPATIENT
Start: 2024-02-29 | End: 2024-03-07

## 2024-02-29 ASSESSMENT — ENCOUNTER SYMPTOMS
SHORTNESS OF BREATH: 0
EYE DISCHARGE: 0
ABDOMINAL DISTENTION: 0
ALLERGIC/IMMUNOLOGIC NEGATIVE: 1

## 2024-02-29 NOTE — PROGRESS NOTES
Becki Figueroa (:  1945) is a 78 y.o. female,New patient, here for evaluation of the following chief complaint(s):  Elbow Injury (Right elbow dislocation ed f/u DOI 24. Has pain constantly but is tolerable. )         ASSESSMENT/PLAN:  1. Closed posterior dislocation of elbow, right, initial encounter  2. Right elbow pain    Discussed injury with patient  Will plan to splint for a total of 3 more weeks and then transition to bracing and PT with ROM  Will leave R hand out of splint  She has not dislocated before and there are no associated fractures  Ultram for pain Rx'd - 50mg TID for 7 days    Return in about 3 weeks (around 3/21/2024).         Subjective   SUBJECTIVE/OBJECTIVE:  HPI    78-year-old female presents the office for the first time to discuss her right elbow.  She fell and had an posterior elbow dislocation about 1 week ago.  She was seen in the emergency department and underwent closed reduction.  There were no associated fractures that were identified.  She was placed in a splint and presents here for follow-up.  She notes that she has had previous stroke and has minimal use of her left hand and therefore this injury is fairly debilitating to her.  Her pain is fairly manageable in the splint at this time.  She is here today to discuss further treatment options.    Past Medical History:   Diagnosis Date    Angioma     brain    Anxiety     Ataxia     Cerebrovascular accident (CVA) (HCC)     Per patient at age 10-11    Cerebrovascular disease     Chronic back pain     GERD (gastroesophageal reflux disease)     Headache     Hyperlipidemia      Past Surgical History:   Procedure Laterality Date    BRAIN SURGERY  2010    Cerebellar Cavernous Angioma resection     BREAST CYST ASPIRATION      BREAST REDUCTION SURGERY Bilateral     HAND SURGERY Left     trapezius removal     HYSTERECTOMY, TOTAL ABDOMINAL (CERVIX REMOVED)      SALIVARY GLAND SURGERY Left     TONSILLECTOMY

## 2024-03-01 NOTE — TELEPHONE ENCOUNTER
[facility] called back and states they need a new order sent over specifically written on what you would like done for the pt. Please placed order and they will reach back out to the pt to schedule.     Electronically signed by ANIL SOMERS MA on 3/1/24 at 10:35 AM EST

## 2024-03-01 NOTE — TELEPHONE ENCOUNTER
Spoke with patient, she did not realize that she would be able to get all of those services done with the company. She is not able to get anywhere for appointments or testing so she is totally fine with the skilled nursing to come out. Called Intake at [facility] to advise them she still wants the services

## 2024-03-11 ASSESSMENT — ENCOUNTER SYMPTOMS
DIARRHEA: 0
COUGH: 0
CONSTIPATION: 0
ABDOMINAL PAIN: 0
NAUSEA: 0
SHORTNESS OF BREATH: 0
WHEEZING: 0
VOMITING: 0

## 2024-03-14 ENCOUNTER — CARE COORDINATION (OUTPATIENT)
Dept: CARE COORDINATION | Age: 79
End: 2024-03-14

## 2024-03-14 DIAGNOSIS — K21.9 GASTROESOPHAGEAL REFLUX DISEASE WITHOUT ESOPHAGITIS: ICD-10-CM

## 2024-03-14 DIAGNOSIS — M50.30 DDD (DEGENERATIVE DISC DISEASE), CERVICAL: ICD-10-CM

## 2024-03-14 DIAGNOSIS — F39 MOOD DISORDER (HCC): ICD-10-CM

## 2024-03-14 DIAGNOSIS — I10 ESSENTIAL HYPERTENSION: ICD-10-CM

## 2024-03-14 DIAGNOSIS — E55.9 VITAMIN D DEFICIENCY: ICD-10-CM

## 2024-03-14 DIAGNOSIS — E78.2 MIXED HYPERLIPIDEMIA: ICD-10-CM

## 2024-03-14 NOTE — CARE COORDINATION
[facility] placed follow up call to Becki.   [facility] inquired if Samia (niece) could be merged onto the call, Becki agreed.   [facility] merged call with Samia.         [facility] inquired if Becki had heard back from [employer].  Samia states they have been sending someone out for the past 2 Wednesday's.   Becki states it is for 3 1/2 hours but she feels she only needs 2 hours.  [facility] encouraged Becki/Samia to let the HHA know that they are requesting the hours to be dropped to the 2 hours.        Becki reports the HDM's are going very well and she has been getting them for 2 weeks.       Processed the concern that Sita with [facility] was having with Becki and her medications.  Samia states she has spoken with Sita and they are working on this together.  Samia states she will reach out to the PCP office to make the request for the medications/refills to be sent to [facility].      Samia reports that the application for the [facility] transportation was completed, approved, and they have already scheduled a trip for the next appointment 3/21/24.        Becki inquired if the PCP ordered the C for her as they had discussed.   [facility] reviewed chart and notes the order was placed on 3/8.   Becki is requesting that she have PT as well as the NSG.   [facility] will contact [facility] to update on order in chart and request for addition of the PT/OT.   [facility] placed a call to [facility].   Spoke with Peyton and she states they do have it and accepted it.  Peyton reports they can reach out to the PCP office to get the PT/OT order added on.   First visit will be 3/19/24 for NSG.         Samia and Becki feel that all needs are currently met and in place.  They are agreeable to [facility] signing off today.  They have [facility] number if they need to reach out for any new concern/issue.         [facility] will sign off today

## 2024-03-15 RX ORDER — BUPROPION HYDROCHLORIDE 300 MG/1
300 TABLET ORAL EVERY MORNING
Qty: 90 TABLET | Refills: 1 | Status: SHIPPED | OUTPATIENT
Start: 2024-03-15 | End: 2024-09-11

## 2024-03-15 RX ORDER — PAROXETINE 10 MG/1
10 TABLET, FILM COATED ORAL DAILY
Qty: 90 TABLET | Refills: 1 | Status: SHIPPED | OUTPATIENT
Start: 2024-03-15 | End: 2024-09-11

## 2024-03-15 RX ORDER — FAMOTIDINE 20 MG/1
20 TABLET, FILM COATED ORAL 2 TIMES DAILY
Qty: 180 TABLET | Refills: 1 | Status: SHIPPED | OUTPATIENT
Start: 2024-03-15 | End: 2024-09-11

## 2024-03-15 RX ORDER — ROSUVASTATIN CALCIUM 20 MG/1
20 TABLET, COATED ORAL NIGHTLY
Qty: 90 TABLET | Refills: 1 | Status: SHIPPED | OUTPATIENT
Start: 2024-03-15

## 2024-03-15 RX ORDER — CHOLECALCIFEROL (VITAMIN D3) 1250 MCG
CAPSULE ORAL
Qty: 6 CAPSULE | Refills: 0 | OUTPATIENT
Start: 2024-03-15

## 2024-03-15 RX ORDER — TIZANIDINE 4 MG/1
TABLET ORAL
Qty: 180 TABLET | Refills: 1 | Status: SHIPPED | OUTPATIENT
Start: 2024-03-15

## 2024-03-15 RX ORDER — PROPRANOLOL HCL 60 MG
60 CAPSULE, EXTENDED RELEASE 24HR ORAL DAILY
Qty: 90 CAPSULE | Refills: 1 | Status: SHIPPED | OUTPATIENT
Start: 2024-03-15 | End: 2024-09-11

## 2024-03-15 RX ORDER — AMLODIPINE BESYLATE 5 MG/1
5 TABLET ORAL DAILY
Qty: 90 TABLET | Refills: 1 | Status: SHIPPED | OUTPATIENT
Start: 2024-03-15 | End: 2024-09-11

## 2024-03-15 RX ORDER — OMEPRAZOLE 20 MG/1
20 CAPSULE, DELAYED RELEASE ORAL DAILY
Qty: 90 CAPSULE | Refills: 1 | Status: SHIPPED | OUTPATIENT
Start: 2024-03-15 | End: 2024-09-11

## 2024-03-15 RX ORDER — BACLOFEN 10 MG/1
10 TABLET ORAL 3 TIMES DAILY PRN
Qty: 90 TABLET | Refills: 0 | OUTPATIENT
Start: 2024-03-15

## 2024-03-16 DIAGNOSIS — E55.9 VITAMIN D DEFICIENCY: ICD-10-CM

## 2024-03-18 RX ORDER — CHOLECALCIFEROL (VITAMIN D3) 1250 MCG
CAPSULE ORAL
Qty: 2 CAPSULE | Refills: 0 | OUTPATIENT
Start: 2024-03-18

## 2024-03-18 NOTE — TELEPHONE ENCOUNTER
Last seen 2/27/2024  Next appt 8/15/2024    Electronically signed by ANIL SOMERS MA on 3/18/24 at 8:50 AM EDT

## 2024-03-20 DIAGNOSIS — S53.124A CLOSED POSTERIOR DISLOCATION OF ELBOW, RIGHT, INITIAL ENCOUNTER: Primary | ICD-10-CM

## 2024-03-21 ENCOUNTER — OFFICE VISIT (OUTPATIENT)
Dept: ORTHOPEDIC SURGERY | Age: 79
End: 2024-03-21
Payer: MEDICARE

## 2024-03-21 VITALS — TEMPERATURE: 98 F | BODY MASS INDEX: 30.16 KG/M2 | HEIGHT: 65 IN | WEIGHT: 181 LBS

## 2024-03-21 DIAGNOSIS — S53.124A CLOSED POSTERIOR DISLOCATION OF ELBOW, RIGHT, INITIAL ENCOUNTER: Primary | ICD-10-CM

## 2024-03-21 PROCEDURE — 1123F ACP DISCUSS/DSCN MKR DOCD: CPT | Performed by: ORTHOPAEDIC SURGERY

## 2024-03-21 PROCEDURE — 99213 OFFICE O/P EST LOW 20 MIN: CPT | Performed by: ORTHOPAEDIC SURGERY

## 2024-03-21 ASSESSMENT — ENCOUNTER SYMPTOMS
ABDOMINAL DISTENTION: 0
EYE DISCHARGE: 0
ALLERGIC/IMMUNOLOGIC NEGATIVE: 1
SHORTNESS OF BREATH: 0

## 2024-03-21 NOTE — PROGRESS NOTES
Becki Figueroa (:  1945) is a 78 y.o. female,Established patient, here for evaluation of the following chief complaint(s):  Elbow Injury (Right elbow pain f/u  doing abetter, no pain does have some warm spot. )         ASSESSMENT/PLAN:  1. Closed posterior dislocation of elbow, right, initial encounter    Transition from splint to brace  TROM brace provided 30-full flexion    Return in about 4 weeks (around 2024).         Subjective   SUBJECTIVE/OBJECTIVE:  HPI    Patient returns 4 weeks s/p R elbow dislocation.  Doing well.  No issues.  Uses a walker    Past Medical History:   Diagnosis Date    Angioma     brain    Anxiety     Ataxia     Cerebrovascular accident (CVA) (HCC)     Per patient at age 10-11    Cerebrovascular disease     Chronic back pain     GERD (gastroesophageal reflux disease)     Headache     Hyperlipidemia      Past Surgical History:   Procedure Laterality Date    BRAIN SURGERY  2010    Cerebellar Cavernous Angioma resection     BREAST CYST ASPIRATION      BREAST REDUCTION SURGERY Bilateral     HAND SURGERY Left     trapezius removal     HYSTERECTOMY, TOTAL ABDOMINAL (CERVIX REMOVED)  1979    SALIVARY GLAND SURGERY Left     TONSILLECTOMY        Family History   Problem Relation Age of Onset    Heart Attack Father 63      Social History     Tobacco Use    Smoking status: Never    Smokeless tobacco: Never   Substance Use Topics    Alcohol use: Never    Drug use: Never        Review of Systems   Constitutional:  Positive for activity change.   HENT:  Negative for congestion.    Eyes:  Negative for discharge.   Respiratory:  Negative for shortness of breath.    Cardiovascular:  Negative for chest pain.   Gastrointestinal:  Negative for abdominal distention.   Endocrine: Negative.    Genitourinary: Negative.    Musculoskeletal:  Positive for arthralgias and joint swelling.   Skin:  Negative for wound.   Allergic/Immunologic: Negative.    Neurological: Negative.

## 2024-04-12 ENCOUNTER — CARE COORDINATION (OUTPATIENT)
Dept: CARE COORDINATION | Age: 79
End: 2024-04-12

## 2024-04-12 NOTE — CARE COORDINATION
[facility] received a call from Becki asking for [facility] to call her back regarding some questions/concerns she has now.     [facility] placed a call to Becki today.  She states she was told by [employer] that if she cancels the services for house keeping that she isn't permitted to request them again in the future.   Becki states she was told it was due to having a long waiting list and that it could be a 2 year wait.       Processed with Becki that the services she has are under the [employer]/title 3 program.  Reviewed the waiting list for HHA's and that the  waiting list isn't as long but they do have one and it can take a long time to get the service back.    Bekci states she isn't ready to end the services as of now and is still in need of them.  She states she made a comment to the lady that is coming out and was told that if she ends the services she wouldn't be able to get them back.  Reviewed again how the services are run under the [employer] program.      Becki was worried that being on the program longer is looked at as taking advantage of the system.  [facility] assured Becki that she is not taking advantage of anyone and she does need the services.   Becki agrees to keep the services now.     [facility] inquired if HHC is still active in the home and Becki states it is and it is going well.     Becki reports she is using the [facility] transportation service and it has been working out well.  She reports it is a $4.00 RT fee but she is managing that.       Becki feels that she had her questions and concerns addressed and does not need follow up from [facility].  [facility] will sign off again.

## 2024-04-18 ENCOUNTER — OFFICE VISIT (OUTPATIENT)
Dept: FAMILY MEDICINE CLINIC | Age: 79
End: 2024-04-18

## 2024-04-18 ENCOUNTER — OFFICE VISIT (OUTPATIENT)
Dept: ORTHOPEDIC SURGERY | Age: 79
End: 2024-04-18
Payer: MEDICARE

## 2024-04-18 VITALS
OXYGEN SATURATION: 96 % | HEIGHT: 65 IN | TEMPERATURE: 97.3 F | DIASTOLIC BLOOD PRESSURE: 80 MMHG | HEART RATE: 67 BPM | BODY MASS INDEX: 30.19 KG/M2 | RESPIRATION RATE: 20 BRPM | WEIGHT: 181.2 LBS | SYSTOLIC BLOOD PRESSURE: 128 MMHG

## 2024-04-18 VITALS — TEMPERATURE: 98 F | HEIGHT: 65 IN | WEIGHT: 181 LBS | BODY MASS INDEX: 30.16 KG/M2

## 2024-04-18 DIAGNOSIS — S53.104D CLOSED DISLOCATION OF RIGHT ELBOW, SUBSEQUENT ENCOUNTER: Primary | ICD-10-CM

## 2024-04-18 DIAGNOSIS — S53.124A CLOSED POSTERIOR DISLOCATION OF ELBOW, RIGHT, INITIAL ENCOUNTER: Primary | ICD-10-CM

## 2024-04-18 DIAGNOSIS — M17.0 OSTEOARTHRITIS OF BOTH KNEES, UNSPECIFIED OSTEOARTHRITIS TYPE: ICD-10-CM

## 2024-04-18 DIAGNOSIS — R73.09 ELEVATED GLUCOSE: ICD-10-CM

## 2024-04-18 DIAGNOSIS — E78.2 MIXED HYPERLIPIDEMIA: ICD-10-CM

## 2024-04-18 DIAGNOSIS — E55.9 VITAMIN D DEFICIENCY: ICD-10-CM

## 2024-04-18 DIAGNOSIS — E79.0 ELEVATED URIC ACID IN BLOOD: ICD-10-CM

## 2024-04-18 LAB
ALBUMIN: 4.3 G/DL (ref 3.5–5.2)
ALP BLD-CCNC: 92 U/L (ref 35–104)
ALT SERPL-CCNC: 13 U/L (ref 0–32)
ANION GAP SERPL CALCULATED.3IONS-SCNC: 14 MMOL/L (ref 7–16)
AST SERPL-CCNC: 15 U/L (ref 0–31)
BILIRUB SERPL-MCNC: 0.2 MG/DL (ref 0–1.2)
BUN BLDV-MCNC: 13 MG/DL (ref 6–23)
CALCIUM SERPL-MCNC: 9.4 MG/DL (ref 8.6–10.2)
CHLORIDE BLD-SCNC: 101 MMOL/L (ref 98–107)
CHOLESTEROL: 177 MG/DL
CO2: 25 MMOL/L (ref 22–29)
CREAT SERPL-MCNC: 0.7 MG/DL (ref 0.5–1)
GFR SERPL CREATININE-BSD FRML MDRD: 86 ML/MIN/1.73M2
GLUCOSE BLD-MCNC: 123 MG/DL (ref 74–99)
HBA1C MFR BLD: 5.4 % (ref 4–5.6)
HCT VFR BLD CALC: 42.9 % (ref 34–48)
HDLC SERPL-MCNC: 79 MG/DL
HEMOGLOBIN: 13.8 G/DL (ref 11.5–15.5)
LDL CHOLESTEROL: 78 MG/DL
MCH RBC QN AUTO: 30.7 PG (ref 26–35)
MCHC RBC AUTO-ENTMCNC: 32.2 G/DL (ref 32–34.5)
MCV RBC AUTO: 95.3 FL (ref 80–99.9)
PDW BLD-RTO: 13.2 % (ref 11.5–15)
PLATELET # BLD: 285 K/UL (ref 130–450)
PMV BLD AUTO: 10.5 FL (ref 7–12)
POTASSIUM SERPL-SCNC: 3.7 MMOL/L (ref 3.5–5)
RBC # BLD: 4.5 M/UL (ref 3.5–5.5)
SODIUM BLD-SCNC: 140 MMOL/L (ref 132–146)
TOTAL PROTEIN: 7.1 G/DL (ref 6.4–8.3)
TRIGL SERPL-MCNC: 100 MG/DL
URIC ACID: 5.1 MG/DL (ref 2.4–5.7)
VITAMIN D 25-HYDROXY: 42.1 NG/ML (ref 30–100)
VLDLC SERPL CALC-MCNC: 20 MG/DL
WBC # BLD: 6.5 K/UL (ref 4.5–11.5)

## 2024-04-18 PROCEDURE — 99213 OFFICE O/P EST LOW 20 MIN: CPT | Performed by: ORTHOPAEDIC SURGERY

## 2024-04-18 PROCEDURE — 1123F ACP DISCUSS/DSCN MKR DOCD: CPT | Performed by: ORTHOPAEDIC SURGERY

## 2024-04-18 RX ORDER — DOCUSATE SODIUM 100 MG/1
100 CAPSULE, LIQUID FILLED ORAL 2 TIMES DAILY
COMMUNITY

## 2024-04-18 SDOH — ECONOMIC STABILITY: FOOD INSECURITY: WITHIN THE PAST 12 MONTHS, THE FOOD YOU BOUGHT JUST DIDN'T LAST AND YOU DIDN'T HAVE MONEY TO GET MORE.: NEVER TRUE

## 2024-04-18 SDOH — ECONOMIC STABILITY: FOOD INSECURITY: WITHIN THE PAST 12 MONTHS, YOU WORRIED THAT YOUR FOOD WOULD RUN OUT BEFORE YOU GOT MONEY TO BUY MORE.: NEVER TRUE

## 2024-04-18 SDOH — ECONOMIC STABILITY: INCOME INSECURITY: HOW HARD IS IT FOR YOU TO PAY FOR THE VERY BASICS LIKE FOOD, HOUSING, MEDICAL CARE, AND HEATING?: NOT HARD AT ALL

## 2024-04-18 ASSESSMENT — ENCOUNTER SYMPTOMS
ALLERGIC/IMMUNOLOGIC NEGATIVE: 1
SHORTNESS OF BREATH: 0
ABDOMINAL DISTENTION: 0
EYE DISCHARGE: 0

## 2024-04-18 NOTE — PROGRESS NOTES
Venipuncture was obtained from left arm, with 1 attempt. Patient tolerated the procedure without complications or complaints.  Electronically signed by MICHAEL IRWIN LPN on 4/18/24 at 3:24 PM EDT   
f/u.     Counseled regarding above diagnosis, including possible risks and complications, especially if left uncontrolled.    Patient counseled on red flag symptoms and if they occur to go to the ED.     Discussed medications risk/benefits and possible side effects and alternatives to treatment. Patient and/or guardian verbalizes understanding, agrees, feels comfortable with and wishes to proceed with above treatment plan.      Advised patient regarding importance of keeping up with recommended health maintenance and to schedule as soon as possible if overdue, as this is important in assessing for undiagnosed pathology, especially cancer, as well as protecting against potentially harmful/life threatening disease.       Patient and/or guardian verbalizes understanding and agrees with above counseling, assessment and plan. All questions answered.    Please note this report has been partially produced using speech recognition software  and may contain errors related to that system including grammar, punctuation and spelling as well as words and phrases that may seem inappropriate. If there are questions or concerns please feel free to contact me to clarify.    
No

## 2024-04-18 NOTE — PROGRESS NOTES
Becki Figueroa (:  1945) is a 79 y.o. female,Established patient, here for evaluation of the following chief complaint(s):  Elbow Injury (Right elbow dislocation f/u. Patient states elbow is doing well. Does have some pain in the forearm at times. Not wearing brace. No other new problems. )      Assessment & Plan   ASSESSMENT/PLAN:  1. Closed posterior dislocation of elbow, right, initial encounter    Ok to use arm as tolerated  Wean out of brace    Return if symptoms worsen or fail to improve.         Subjective   SUBJECTIVE/OBJECTIVE:  HPI    8 weeks s/p R elbow dislocation.  No issues.  Has not been using brace.  Denies pain.    Past Medical History:   Diagnosis Date    Angioma     brain    Anxiety     Ataxia     Cerebrovascular accident (CVA) (HCC)     Per patient at age 10-11    Cerebrovascular disease     Chronic back pain     GERD (gastroesophageal reflux disease)     Headache     Hyperlipidemia      Past Surgical History:   Procedure Laterality Date    BRAIN SURGERY  2010    Cerebellar Cavernous Angioma resection     BREAST CYST ASPIRATION      BREAST REDUCTION SURGERY Bilateral     HAND SURGERY Left     trapezius removal     HYSTERECTOMY, TOTAL ABDOMINAL (CERVIX REMOVED)      SALIVARY GLAND SURGERY Left 1992    TONSILLECTOMY        Family History   Problem Relation Age of Onset    Heart Attack Father 63      Social History     Tobacco Use    Smoking status: Never    Smokeless tobacco: Never   Substance Use Topics    Alcohol use: Never    Drug use: Never        Review of Systems   Constitutional:  Positive for activity change.   HENT:  Negative for congestion.    Eyes:  Negative for discharge.   Respiratory:  Negative for shortness of breath.    Cardiovascular:  Negative for chest pain.   Gastrointestinal:  Negative for abdominal distention.   Endocrine: Negative.    Genitourinary: Negative.    Musculoskeletal:  Positive for arthralgias and joint swelling.   Skin:  Negative for wound.

## 2024-08-15 ENCOUNTER — OFFICE VISIT (OUTPATIENT)
Dept: FAMILY MEDICINE CLINIC | Age: 79
End: 2024-08-15
Payer: MEDICARE

## 2024-08-15 VITALS
HEIGHT: 65 IN | DIASTOLIC BLOOD PRESSURE: 72 MMHG | SYSTOLIC BLOOD PRESSURE: 122 MMHG | TEMPERATURE: 97.2 F | OXYGEN SATURATION: 96 % | RESPIRATION RATE: 18 BRPM | BODY MASS INDEX: 30.26 KG/M2 | HEART RATE: 66 BPM | WEIGHT: 181.6 LBS

## 2024-08-15 DIAGNOSIS — E55.9 VITAMIN D DEFICIENCY: ICD-10-CM

## 2024-08-15 DIAGNOSIS — M19.90 ARTHRITIS: ICD-10-CM

## 2024-08-15 DIAGNOSIS — M17.0 OSTEOARTHRITIS OF BOTH KNEES, UNSPECIFIED OSTEOARTHRITIS TYPE: Primary | ICD-10-CM

## 2024-08-15 DIAGNOSIS — R51.9 NONINTRACTABLE HEADACHE, UNSPECIFIED CHRONICITY PATTERN, UNSPECIFIED HEADACHE TYPE: ICD-10-CM

## 2024-08-15 DIAGNOSIS — R73.03 PREDIABETES: ICD-10-CM

## 2024-08-15 DIAGNOSIS — R91.1 PULMONARY NODULE: ICD-10-CM

## 2024-08-15 PROCEDURE — 3074F SYST BP LT 130 MM HG: CPT | Performed by: FAMILY MEDICINE

## 2024-08-15 PROCEDURE — 99214 OFFICE O/P EST MOD 30 MIN: CPT | Performed by: FAMILY MEDICINE

## 2024-08-15 PROCEDURE — 1123F ACP DISCUSS/DSCN MKR DOCD: CPT | Performed by: FAMILY MEDICINE

## 2024-08-15 PROCEDURE — G2211 COMPLEX E/M VISIT ADD ON: HCPCS | Performed by: FAMILY MEDICINE

## 2024-08-15 PROCEDURE — 3078F DIAST BP <80 MM HG: CPT | Performed by: FAMILY MEDICINE

## 2024-08-15 RX ORDER — BACLOFEN 10 MG/1
10 TABLET ORAL 3 TIMES DAILY PRN
Qty: 90 TABLET | Refills: 1 | Status: SHIPPED | OUTPATIENT
Start: 2024-08-15

## 2024-08-15 NOTE — PROGRESS NOTES
[facility]  Family Medicine Outpatient    Patient Care Team:  Kimberly Sewell MD as PCP - General (Family Medicine)  Kimberly Sewell MD as PCP - Empaneled Provider      SUBJECTIVE:  CC: had concerns including Medication Refill (Pt needs a med refill.) and Pain (Pt here for a 6 month check. /).  HPI:  Becki Figueroa is a female 79 y.o. presented to the clinic for an established visit. Last labs 4/2024. Has known arthritis of her b/l knees. Encourage use of a walker prn.     Review of Systems   Constitutional:  Negative for appetite change, fatigue and fever.   Eyes:  Positive for visual disturbance (chronic). Negative for pain.   Respiratory:  Negative for cough, shortness of breath and wheezing.    Cardiovascular:  Negative for chest pain and palpitations.   Gastrointestinal:  Negative for abdominal pain, constipation, diarrhea, nausea and vomiting.   Genitourinary:  Negative for dysuria and frequency.   Musculoskeletal:  Positive for arthralgias.   Neurological:  Positive for tremors (chronic) and headaches (chronic). Negative for seizures and syncope.       Outpatient Medications Marked as Taking for the 8/15/24 encounter (Office Visit) with Kimberly Sewell MD   Medication Sig Dispense Refill    baclofen (LIORESAL) 10 MG tablet Take 1 tablet by mouth 3 times daily as needed (muscle spasm) 90 tablet 1    docusate sodium (COLACE) 100 MG capsule Take 1 capsule by mouth 2 times daily      amLODIPine (NORVASC) 5 MG tablet Take 1 tablet by mouth daily 90 tablet 1    buPROPion (WELLBUTRIN XL) 300 MG extended release tablet Take 1 tablet by mouth every morning 90 tablet 1    famotidine (PEPCID) 20 MG tablet Take 1 tablet by mouth 2 times daily 180 tablet 1    PARoxetine (PAXIL) 10 MG tablet Take 1 tablet by mouth daily 90 tablet 1    propranolol (INDERAL LA) 60 MG extended release capsule Take 1 capsule by mouth daily 90 capsule 1    rosuvastatin (CRESTOR) 20 MG tablet Take 1 tablet by mouth nightly 90

## 2024-08-16 ENCOUNTER — TELEPHONE (OUTPATIENT)
Dept: FAMILY MEDICINE CLINIC | Age: 79
End: 2024-08-16

## 2024-08-16 NOTE — TELEPHONE ENCOUNTER
Pharmacy called and is asking is patient to be on both the tizanidine and baclofen Please advise

## 2024-08-26 ASSESSMENT — ENCOUNTER SYMPTOMS: EYE PAIN: 0

## 2024-08-29 DIAGNOSIS — E78.2 MIXED HYPERLIPIDEMIA: ICD-10-CM

## 2024-08-29 DIAGNOSIS — F39 MOOD DISORDER (HCC): ICD-10-CM

## 2024-08-29 DIAGNOSIS — K21.9 GASTROESOPHAGEAL REFLUX DISEASE WITHOUT ESOPHAGITIS: ICD-10-CM

## 2024-08-29 DIAGNOSIS — I10 ESSENTIAL HYPERTENSION: ICD-10-CM

## 2024-08-30 RX ORDER — PAROXETINE 10 MG/1
10 TABLET, FILM COATED ORAL DAILY
Qty: 90 TABLET | Refills: 1 | Status: SHIPPED | OUTPATIENT
Start: 2024-08-30 | End: 2025-02-26

## 2024-08-30 RX ORDER — ROSUVASTATIN CALCIUM 20 MG/1
20 TABLET, COATED ORAL NIGHTLY
Qty: 90 TABLET | Refills: 1 | Status: SHIPPED | OUTPATIENT
Start: 2024-08-30 | End: 2025-02-26

## 2024-08-30 RX ORDER — PROPRANOLOL HCL 60 MG
60 CAPSULE, EXTENDED RELEASE 24HR ORAL DAILY
Qty: 90 CAPSULE | Refills: 1 | Status: SHIPPED | OUTPATIENT
Start: 2024-08-30 | End: 2025-02-26

## 2024-08-30 RX ORDER — AMLODIPINE BESYLATE 5 MG/1
5 TABLET ORAL DAILY
Qty: 90 TABLET | Refills: 1 | Status: SHIPPED | OUTPATIENT
Start: 2024-08-30 | End: 2025-02-26

## 2024-08-30 RX ORDER — BUPROPION HYDROCHLORIDE 300 MG/1
300 TABLET ORAL EVERY MORNING
Qty: 90 TABLET | Refills: 1 | Status: SHIPPED | OUTPATIENT
Start: 2024-08-30 | End: 2025-02-26

## 2024-10-28 DIAGNOSIS — K21.9 GASTROESOPHAGEAL REFLUX DISEASE WITHOUT ESOPHAGITIS: ICD-10-CM

## 2024-10-28 NOTE — TELEPHONE ENCOUNTER
Last Appointment:  8/15/2024  Future Appointments   Date Time Provider Department Center   11/26/2024 12:45 PM Kimberly Sewell MD [facility] DEP

## 2024-10-30 RX ORDER — FAMOTIDINE 20 MG/1
20 TABLET, FILM COATED ORAL 2 TIMES DAILY
Qty: 60 TABLET | Refills: 1 | Status: SHIPPED | OUTPATIENT
Start: 2024-10-30

## 2024-12-26 DIAGNOSIS — K21.9 GASTROESOPHAGEAL REFLUX DISEASE WITHOUT ESOPHAGITIS: ICD-10-CM

## 2024-12-26 NOTE — TELEPHONE ENCOUNTER
Name of Medication(s) Requested:  Requested Prescriptions     Pending Prescriptions Disp Refills    famotidine (PEPCID) 20 MG tablet 60 tablet 1     Sig: Take 1 tablet by mouth 2 times daily       Medication is on current medication list Yes and No    Dosage and directions were verified? Yes    Quantity verified: 30 day supply     Pharmacy Verified?  Yes    Last Appointment:  8/15/2024    Future appts:  Future Appointments   Date Time Provider Department Center   1/23/2025 10:00 AM Kimberly Sewell MD [place] [facility] DEP        (If no appt send self scheduling link. .REFILLAPPT)  Scheduling request sent?     [] Yes  [x] No    Does patient need updated?  [] Yes  [x] No

## 2024-12-27 RX ORDER — FAMOTIDINE 20 MG/1
20 TABLET, FILM COATED ORAL 2 TIMES DAILY
Qty: 60 TABLET | Refills: 1 | Status: SHIPPED | OUTPATIENT
Start: 2024-12-27

## 2025-02-17 DIAGNOSIS — I10 ESSENTIAL HYPERTENSION: ICD-10-CM

## 2025-02-17 DIAGNOSIS — F39 MOOD DISORDER: ICD-10-CM

## 2025-02-17 DIAGNOSIS — E78.2 MIXED HYPERLIPIDEMIA: ICD-10-CM

## 2025-02-17 DIAGNOSIS — K21.9 GASTROESOPHAGEAL REFLUX DISEASE WITHOUT ESOPHAGITIS: ICD-10-CM

## 2025-02-17 RX ORDER — PROPRANOLOL HYDROCHLORIDE 60 MG/1
60 CAPSULE, EXTENDED RELEASE ORAL DAILY
Qty: 90 CAPSULE | Refills: 0 | Status: SHIPPED | OUTPATIENT
Start: 2025-02-17 | End: 2025-08-16

## 2025-02-17 RX ORDER — PAROXETINE 10 MG/1
10 TABLET, FILM COATED ORAL DAILY
Qty: 90 TABLET | Refills: 0 | Status: SHIPPED | OUTPATIENT
Start: 2025-02-17 | End: 2025-08-16

## 2025-02-17 RX ORDER — AMLODIPINE BESYLATE 5 MG/1
5 TABLET ORAL DAILY
Qty: 90 TABLET | Refills: 0 | Status: SHIPPED | OUTPATIENT
Start: 2025-02-17 | End: 2025-08-16

## 2025-02-17 RX ORDER — ROSUVASTATIN CALCIUM 20 MG/1
20 TABLET, COATED ORAL NIGHTLY
Qty: 90 TABLET | Refills: 0 | Status: SHIPPED | OUTPATIENT
Start: 2025-02-17 | End: 2025-08-16

## 2025-02-17 RX ORDER — OMEPRAZOLE 20 MG/1
20 CAPSULE, DELAYED RELEASE ORAL DAILY
Qty: 90 CAPSULE | Refills: 0 | Status: SHIPPED | OUTPATIENT
Start: 2025-02-17 | End: 2025-08-16

## 2025-02-17 RX ORDER — FAMOTIDINE 20 MG/1
20 TABLET, FILM COATED ORAL 2 TIMES DAILY
Qty: 180 TABLET | Refills: 0 | Status: SHIPPED | OUTPATIENT
Start: 2025-02-17 | End: 2025-08-16

## 2025-02-17 RX ORDER — BUPROPION HYDROCHLORIDE 300 MG/1
300 TABLET ORAL EVERY MORNING
Qty: 90 TABLET | Refills: 0 | Status: SHIPPED | OUTPATIENT
Start: 2025-02-17 | End: 2025-08-16

## 2025-04-08 ENCOUNTER — OFFICE VISIT (OUTPATIENT)
Dept: FAMILY MEDICINE CLINIC | Age: 80
End: 2025-04-08

## 2025-04-08 VITALS
RESPIRATION RATE: 18 BRPM | DIASTOLIC BLOOD PRESSURE: 76 MMHG | OXYGEN SATURATION: 96 % | SYSTOLIC BLOOD PRESSURE: 126 MMHG | HEART RATE: 60 BPM | HEIGHT: 65 IN | WEIGHT: 183 LBS | BODY MASS INDEX: 30.49 KG/M2 | TEMPERATURE: 97.7 F

## 2025-04-08 DIAGNOSIS — E55.9 VITAMIN D DEFICIENCY: ICD-10-CM

## 2025-04-08 DIAGNOSIS — Z00.00 MEDICARE ANNUAL WELLNESS VISIT, SUBSEQUENT: Primary | ICD-10-CM

## 2025-04-08 DIAGNOSIS — E78.2 MIXED HYPERLIPIDEMIA: ICD-10-CM

## 2025-04-08 DIAGNOSIS — R53.83 FATIGUE, UNSPECIFIED TYPE: ICD-10-CM

## 2025-04-08 DIAGNOSIS — F32.1 CURRENT MODERATE EPISODE OF MAJOR DEPRESSIVE DISORDER, UNSPECIFIED WHETHER RECURRENT (HCC): ICD-10-CM

## 2025-04-08 DIAGNOSIS — M50.30 DDD (DEGENERATIVE DISC DISEASE), CERVICAL: ICD-10-CM

## 2025-04-08 DIAGNOSIS — R73.09 ELEVATED GLUCOSE: ICD-10-CM

## 2025-04-08 DIAGNOSIS — I10 ESSENTIAL HYPERTENSION: ICD-10-CM

## 2025-04-08 DIAGNOSIS — R14.0 BLOATING: ICD-10-CM

## 2025-04-08 DIAGNOSIS — K21.9 GASTROESOPHAGEAL REFLUX DISEASE WITHOUT ESOPHAGITIS: ICD-10-CM

## 2025-04-08 DIAGNOSIS — K59.00 CONSTIPATION, UNSPECIFIED CONSTIPATION TYPE: ICD-10-CM

## 2025-04-08 DIAGNOSIS — K57.90 DIVERTICULOSIS: ICD-10-CM

## 2025-04-08 DIAGNOSIS — H91.90 HARD OF HEARING: ICD-10-CM

## 2025-04-08 DIAGNOSIS — Z78.0 POSTMENOPAUSAL: ICD-10-CM

## 2025-04-08 DIAGNOSIS — F39 MOOD DISORDER: ICD-10-CM

## 2025-04-08 DIAGNOSIS — J44.9 CHRONIC OBSTRUCTIVE PULMONARY DISEASE, UNSPECIFIED COPD TYPE (HCC): ICD-10-CM

## 2025-04-08 DIAGNOSIS — E53.8 B12 DEFICIENCY: ICD-10-CM

## 2025-04-08 LAB
ALBUMIN: 4.2 G/DL (ref 3.5–5.2)
ALP BLD-CCNC: 119 U/L (ref 35–104)
ALT SERPL-CCNC: 30 U/L (ref 0–32)
ANION GAP SERPL CALCULATED.3IONS-SCNC: 15 MMOL/L (ref 7–16)
AST SERPL-CCNC: 28 U/L (ref 0–31)
BASOPHILS ABSOLUTE: 0.04 K/UL (ref 0–0.2)
BASOPHILS RELATIVE PERCENT: 1 % (ref 0–2)
BILIRUB SERPL-MCNC: 0.3 MG/DL (ref 0–1.2)
BUN BLDV-MCNC: 12 MG/DL (ref 6–23)
CALCIUM SERPL-MCNC: 9.6 MG/DL (ref 8.6–10.2)
CHLORIDE BLD-SCNC: 100 MMOL/L (ref 98–107)
CHOLESTEROL, TOTAL: 257 MG/DL
CO2: 28 MMOL/L (ref 22–29)
CREAT SERPL-MCNC: 0.8 MG/DL (ref 0.5–1)
EOSINOPHILS ABSOLUTE: 0.19 K/UL (ref 0.05–0.5)
EOSINOPHILS RELATIVE PERCENT: 2 % (ref 0–6)
FOLATE: 8.5 NG/ML (ref 4.8–24.2)
GFR, ESTIMATED: 75 ML/MIN/1.73M2
GLUCOSE BLD-MCNC: 106 MG/DL (ref 74–99)
HBA1C MFR BLD: 5.5 % (ref 4–5.6)
HCT VFR BLD CALC: 42.7 % (ref 34–48)
HDLC SERPL-MCNC: 80 MG/DL
HEMOGLOBIN: 13.9 G/DL (ref 11.5–15.5)
IMMATURE GRANULOCYTES %: 1 % (ref 0–5)
IMMATURE GRANULOCYTES ABSOLUTE: 0.05 K/UL (ref 0–0.58)
LDL CHOLESTEROL: 149 MG/DL
LYMPHOCYTES ABSOLUTE: 2.6 K/UL (ref 1.5–4)
LYMPHOCYTES RELATIVE PERCENT: 32 % (ref 20–42)
MCH RBC QN AUTO: 30.6 PG (ref 26–35)
MCHC RBC AUTO-ENTMCNC: 32.6 G/DL (ref 32–34.5)
MCV RBC AUTO: 94.1 FL (ref 80–99.9)
MONOCYTES ABSOLUTE: 0.78 K/UL (ref 0.1–0.95)
MONOCYTES RELATIVE PERCENT: 10 % (ref 2–12)
NEUTROPHILS ABSOLUTE: 4.43 K/UL (ref 1.8–7.3)
NEUTROPHILS RELATIVE PERCENT: 55 % (ref 43–80)
PDW BLD-RTO: 13.2 % (ref 11.5–15)
PLATELET # BLD: 309 K/UL (ref 130–450)
PMV BLD AUTO: 10.8 FL (ref 7–12)
POTASSIUM SERPL-SCNC: 3.4 MMOL/L (ref 3.5–5)
RBC # BLD: 4.54 M/UL (ref 3.5–5.5)
SODIUM BLD-SCNC: 143 MMOL/L (ref 132–146)
T4 FREE: 1.5 NG/DL (ref 0.9–1.7)
TOTAL PROTEIN: 7.2 G/DL (ref 6.4–8.3)
TRIGL SERPL-MCNC: 139 MG/DL
TSH SERPL DL<=0.05 MIU/L-ACNC: 1.78 UIU/ML (ref 0.27–4.2)
URIC ACID: 5.3 MG/DL (ref 2.4–5.7)
VITAMIN B-12: >2000 PG/ML (ref 211–946)
VITAMIN D 25-HYDROXY: 20 NG/ML (ref 30–100)
VLDLC SERPL CALC-MCNC: 28 MG/DL
WBC # BLD: 8.1 K/UL (ref 4.5–11.5)

## 2025-04-08 RX ORDER — PAROXETINE 10 MG/1
10 TABLET, FILM COATED ORAL DAILY
Qty: 90 TABLET | Refills: 1 | Status: CANCELLED | OUTPATIENT
Start: 2025-04-08 | End: 2025-10-05

## 2025-04-08 RX ORDER — ROSUVASTATIN CALCIUM 20 MG/1
20 TABLET, COATED ORAL NIGHTLY
Qty: 90 TABLET | Refills: 1 | Status: SHIPPED
Start: 2025-04-08 | End: 2025-04-09 | Stop reason: DRUGHIGH

## 2025-04-08 RX ORDER — CYANOCOBALAMIN 1000 UG/ML
1000 INJECTION, SOLUTION INTRAMUSCULAR; SUBCUTANEOUS ONCE
Status: COMPLETED | OUTPATIENT
Start: 2025-04-08 | End: 2025-04-08

## 2025-04-08 RX ORDER — BUPROPION HYDROCHLORIDE 300 MG/1
300 TABLET ORAL EVERY MORNING
Qty: 90 TABLET | Refills: 1 | Status: SHIPPED | OUTPATIENT
Start: 2025-04-08 | End: 2025-10-05

## 2025-04-08 RX ORDER — FAMOTIDINE 20 MG/1
20 TABLET, FILM COATED ORAL 2 TIMES DAILY
Qty: 180 TABLET | Refills: 1 | Status: SHIPPED | OUTPATIENT
Start: 2025-04-08 | End: 2025-10-05

## 2025-04-08 RX ORDER — PAROXETINE 20 MG/1
20 TABLET, FILM COATED ORAL DAILY
Qty: 90 TABLET | Refills: 1 | Status: SHIPPED | OUTPATIENT
Start: 2025-04-08

## 2025-04-08 RX ORDER — LANOLIN ALCOHOL/MO/W.PET/CERES
1000 CREAM (GRAM) TOPICAL DAILY
COMMUNITY

## 2025-04-08 RX ORDER — AMLODIPINE BESYLATE 5 MG/1
5 TABLET ORAL DAILY
Qty: 90 TABLET | Refills: 1 | Status: SHIPPED | OUTPATIENT
Start: 2025-04-08 | End: 2025-10-05

## 2025-04-08 RX ORDER — DOCUSATE SODIUM 100 MG/1
100 CAPSULE, LIQUID FILLED ORAL 2 TIMES DAILY
Qty: 180 CAPSULE | Refills: 1 | Status: SHIPPED | OUTPATIENT
Start: 2025-04-08

## 2025-04-08 RX ORDER — CHOLECALCIFEROL (VITAMIN D3) 1250 MCG
CAPSULE ORAL
Qty: 6 CAPSULE | Refills: 1 | Status: CANCELLED | OUTPATIENT
Start: 2025-04-08

## 2025-04-08 RX ORDER — OMEPRAZOLE 20 MG/1
20 CAPSULE, DELAYED RELEASE ORAL DAILY
Qty: 90 CAPSULE | Refills: 1 | Status: SHIPPED | OUTPATIENT
Start: 2025-04-08 | End: 2025-10-05

## 2025-04-08 RX ADMIN — CYANOCOBALAMIN 1000 MCG: 1000 INJECTION, SOLUTION INTRAMUSCULAR; SUBCUTANEOUS at 15:12

## 2025-04-08 SDOH — ECONOMIC STABILITY: FOOD INSECURITY: WITHIN THE PAST 12 MONTHS, YOU WORRIED THAT YOUR FOOD WOULD RUN OUT BEFORE YOU GOT MONEY TO BUY MORE.: NEVER TRUE

## 2025-04-08 SDOH — ECONOMIC STABILITY: FOOD INSECURITY: WITHIN THE PAST 12 MONTHS, THE FOOD YOU BOUGHT JUST DIDN'T LAST AND YOU DIDN'T HAVE MONEY TO GET MORE.: NEVER TRUE

## 2025-04-08 ASSESSMENT — PATIENT HEALTH QUESTIONNAIRE - PHQ9
9. THOUGHTS THAT YOU WOULD BE BETTER OFF DEAD, OR OF HURTING YOURSELF: NOT AT ALL
3. TROUBLE FALLING OR STAYING ASLEEP: NEARLY EVERY DAY
10. IF YOU CHECKED OFF ANY PROBLEMS, HOW DIFFICULT HAVE THESE PROBLEMS MADE IT FOR YOU TO DO YOUR WORK, TAKE CARE OF THINGS AT HOME, OR GET ALONG WITH OTHER PEOPLE: SOMEWHAT DIFFICULT
6. FEELING BAD ABOUT YOURSELF - OR THAT YOU ARE A FAILURE OR HAVE LET YOURSELF OR YOUR FAMILY DOWN: MORE THAN HALF THE DAYS
8. MOVING OR SPEAKING SO SLOWLY THAT OTHER PEOPLE COULD HAVE NOTICED. OR THE OPPOSITE, BEING SO FIGETY OR RESTLESS THAT YOU HAVE BEEN MOVING AROUND A LOT MORE THAN USUAL: NOT AT ALL
1. LITTLE INTEREST OR PLEASURE IN DOING THINGS: MORE THAN HALF THE DAYS
4. FEELING TIRED OR HAVING LITTLE ENERGY: NEARLY EVERY DAY
SUM OF ALL RESPONSES TO PHQ QUESTIONS 1-9: 11
5. POOR APPETITE OR OVEREATING: NOT AT ALL
2. FEELING DOWN, DEPRESSED OR HOPELESS: SEVERAL DAYS
SUM OF ALL RESPONSES TO PHQ QUESTIONS 1-9: 11
7. TROUBLE CONCENTRATING ON THINGS, SUCH AS READING THE NEWSPAPER OR WATCHING TELEVISION: NOT AT ALL
SUM OF ALL RESPONSES TO PHQ QUESTIONS 1-9: 11
SUM OF ALL RESPONSES TO PHQ QUESTIONS 1-9: 11

## 2025-04-08 NOTE — PROGRESS NOTES
Medicare Annual Wellness Visit    Becki Figueroa is here for Medication Refill (Needs refill.) and Medicare AWV (Pt here for her AWV.//Pt states she has been feeling tired, tried taking B-12 OTC QD but has not been helping. //If sits down in a chair, will sit there all day.)    Assessment & Plan    Medicare annual wellness visit, subsequent  Care gaps and survey reviewed.   Essential hypertension  Stable. Continue current regimen. Updated labs placed.  -     amLODIPine (NORVASC) 5 MG tablet; Take 1 tablet by mouth daily, Disp-90 tablet, R-1Normal  -     CBC with Auto Differential; Future  -     Comprehensive Metabolic Panel; Future  -     Lipid Panel; Future  -     TSH; Future  -     T4, Free; Future  -     Uric Acid; Future  Mood disorder  Titrate up Paxil from 10 to 20 mg/qd at this time. Encourage counseling and choosing an event/group to get involved with in the community. No s/h ideations.   -     buPROPion (WELLBUTRIN XL) 300 MG extended release tablet; Take 1 tablet by mouth every morning, Disp-90 tablet, R-1Normal  -     PARoxetine (PAXIL) 20 MG tablet; Take 1 tablet by mouth daily, Disp-90 tablet, R-1Normal  Vitamin D deficiency  -     Vitamin D 25 Hydroxy; Future  Gastroesophageal reflux disease without esophagitis  Gerd diet and conservative treatment. Pepcid and Prilosec regimen. Declines GI referral for a scope.  -     famotidine (PEPCID) 20 MG tablet; Take 1 tablet by mouth 2 times daily, Disp-180 tablet, R-1Normal  -     omeprazole (PRILOSEC) 20 MG delayed release capsule; Take 1 capsule by mouth daily, Disp-90 capsule, R-1Normal  Mixed hyperlipidemia  -     Lipid Panel; Future  DDD (degenerative disc disease), cervical  -     tiZANidine (ZANAFLEX) 4 MG tablet; Take 1 tablet bid prn muscle spasm, Disp-180 tablet, R-1Normal  Constipation, unspecified constipation type  Increase hydration and fiber in diet. Low fodmap diet. Colace regimen with prn Linzess. Advised patient on referral to GI for

## 2025-04-08 NOTE — PATIENT INSTRUCTIONS
healthcare professional. [facility] [place], [facility], disclaims any warranty or liability for your use of this information.    Personalized Preventive Plan for Becki Figueroa - 4/8/2025  Medicare offers a range of preventive health benefits. Some of the tests and screenings are paid in full while other may be subject to a deductible, co-insurance, and/or copay.  Some of these benefits include a comprehensive review of your medical history including lifestyle, illnesses that may run in your family, and various assessments and screenings as appropriate.  After reviewing your medical record and screening and assessments performed today your provider may have ordered immunizations, labs, imaging, and/or referrals for you.  A list of these orders (if applicable) as well as your Preventive Care list are included within your After Visit Summary for your review.

## 2025-04-08 NOTE — PROGRESS NOTES
Venipuncture was obtained from left arm, with 1 attempt. Patient tolerated the procedure without complications or complaints.  Electronically signed by MICHAEL IRWIN LPN on 4/8/25 at 3:14 PM EDT

## 2025-04-09 ENCOUNTER — RESULTS FOLLOW-UP (OUTPATIENT)
Dept: FAMILY MEDICINE CLINIC | Age: 80
End: 2025-04-09

## 2025-04-09 ENCOUNTER — TELEPHONE (OUTPATIENT)
Dept: FAMILY MEDICINE CLINIC | Age: 80
End: 2025-04-09

## 2025-04-09 RX ORDER — ERGOCALCIFEROL 1.25 MG/1
50000 CAPSULE, LIQUID FILLED ORAL WEEKLY
Qty: 12 CAPSULE | Refills: 0 | Status: SHIPPED | OUTPATIENT
Start: 2025-04-09

## 2025-04-09 RX ORDER — ROSUVASTATIN CALCIUM 40 MG/1
40 TABLET, COATED ORAL DAILY
Qty: 30 TABLET | Refills: 5 | Status: SHIPPED | OUTPATIENT
Start: 2025-04-09

## 2025-04-09 RX ORDER — POTASSIUM CHLORIDE 1500 MG/1
TABLET, EXTENDED RELEASE ORAL
Qty: 2 TABLET | Refills: 0 | Status: SHIPPED | OUTPATIENT
Start: 2025-04-09

## 2025-05-15 DIAGNOSIS — I10 ESSENTIAL HYPERTENSION: ICD-10-CM

## 2025-05-15 NOTE — TELEPHONE ENCOUNTER
Last Appointment:  4/8/2025  Future Appointments   Date Time Provider Department Center   8/12/2025 12:45 PM Kimberly Sewell MD [facility]   4/9/2026 12:45 PM Kimberly Sewell MD [facility] DEP

## 2025-05-16 RX ORDER — PROPRANOLOL HYDROCHLORIDE 60 MG/1
60 CAPSULE, EXTENDED RELEASE ORAL DAILY
Qty: 90 CAPSULE | Refills: 1 | Status: SHIPPED | OUTPATIENT
Start: 2025-05-16 | End: 2025-11-12

## 2025-08-25 DIAGNOSIS — F39 MOOD DISORDER: ICD-10-CM

## 2025-08-25 RX ORDER — PAROXETINE 20 MG/1
20 TABLET, FILM COATED ORAL DAILY
Qty: 90 TABLET | Refills: 1 | Status: SHIPPED | OUTPATIENT
Start: 2025-08-25